# Patient Record
Sex: FEMALE | Race: WHITE | NOT HISPANIC OR LATINO | Employment: OTHER | URBAN - METROPOLITAN AREA
[De-identification: names, ages, dates, MRNs, and addresses within clinical notes are randomized per-mention and may not be internally consistent; named-entity substitution may affect disease eponyms.]

---

## 2018-07-25 ENCOUNTER — TRANSCRIBE ORDERS (OUTPATIENT)
Dept: ADMINISTRATIVE | Facility: HOSPITAL | Age: 83
End: 2018-07-25

## 2018-07-25 ENCOUNTER — HOSPITAL ENCOUNTER (OUTPATIENT)
Dept: GENERAL RADIOLOGY | Facility: HOSPITAL | Age: 83
Discharge: HOME | End: 2018-07-25
Admitting: PODIATRIST

## 2018-07-25 DIAGNOSIS — M25.571 JOINT PAIN OF ANKLE AND FOOT, RIGHT: Primary | ICD-10-CM

## 2018-07-25 DIAGNOSIS — M25.572 PAIN IN JOINT, ANKLE AND FOOT, LEFT: ICD-10-CM

## 2018-07-25 PROCEDURE — 73630 X-RAY EXAM OF FOOT: CPT

## 2020-05-18 PROCEDURE — U0003 INFECTIOUS AGENT DETECTION BY NUCLEIC ACID (DNA OR RNA); SEVERE ACUTE RESPIRATORY SYNDROME CORONAVIRUS 2 (SARS-COV-2) (CORONAVIRUS DISEASE [COVID-19]), AMPLIFIED PROBE TECHNIQUE, MAKING USE OF HIGH THROUGHPUT TECHNOLOGIES AS DESCRIBED BY CMS-2020-01-R: HCPCS | Performed by: NURSE PRACTITIONER

## 2020-05-20 ENCOUNTER — TELEPHONE (OUTPATIENT)
Dept: URGENT CARE | Facility: CLINIC | Age: 85
End: 2020-05-20

## 2020-05-21 ENCOUNTER — TELEPHONE (OUTPATIENT)
Dept: URGENT CARE | Facility: CLINIC | Age: 85
End: 2020-05-21

## 2020-08-27 PROCEDURE — U0003 INFECTIOUS AGENT DETECTION BY NUCLEIC ACID (DNA OR RNA); SEVERE ACUTE RESPIRATORY SYNDROME CORONAVIRUS 2 (SARS-COV-2) (CORONAVIRUS DISEASE [COVID-19]), AMPLIFIED PROBE TECHNIQUE, MAKING USE OF HIGH THROUGHPUT TECHNOLOGIES AS DESCRIBED BY CMS-2020-01-R: HCPCS | Performed by: NURSE PRACTITIONER

## 2020-08-31 ENCOUNTER — TELEPHONE (OUTPATIENT)
Dept: URGENT CARE | Facility: CLINIC | Age: 85
End: 2020-08-31

## 2021-01-16 PROCEDURE — U0004 COV-19 TEST NON-CDC HGH THRU: HCPCS | Performed by: FAMILY MEDICINE

## 2022-02-05 ENCOUNTER — HOSPITAL ENCOUNTER (OUTPATIENT)
Facility: HOSPITAL | Age: 87
Setting detail: OBSERVATION
Discharge: HOME HEALTH | End: 2022-02-07
Attending: EMERGENCY MEDICINE | Admitting: INTERNAL MEDICINE

## 2022-02-05 ENCOUNTER — APPOINTMENT (OUTPATIENT)
Dept: GENERAL RADIOLOGY | Facility: HOSPITAL | Age: 87
End: 2022-02-05

## 2022-02-05 DIAGNOSIS — R53.1 WEAKNESS: ICD-10-CM

## 2022-02-05 DIAGNOSIS — N28.9 RENAL INSUFFICIENCY: ICD-10-CM

## 2022-02-05 DIAGNOSIS — N18.30 STAGE 3 CHRONIC KIDNEY DISEASE, UNSPECIFIED WHETHER STAGE 3A OR 3B CKD: ICD-10-CM

## 2022-02-05 DIAGNOSIS — E86.0 DEHYDRATION: ICD-10-CM

## 2022-02-05 DIAGNOSIS — R53.1 GENERALIZED WEAKNESS: ICD-10-CM

## 2022-02-05 DIAGNOSIS — N39.0 ACUTE UTI: Primary | ICD-10-CM

## 2022-02-05 PROBLEM — D72.829 LEUKOCYTOSIS: Status: ACTIVE | Noted: 2022-02-05

## 2022-02-05 PROBLEM — I10 HTN (HYPERTENSION): Status: ACTIVE | Noted: 2022-02-05

## 2022-02-05 PROBLEM — E03.9 HYPOTHYROIDISM (ACQUIRED): Status: ACTIVE | Noted: 2022-02-05

## 2022-02-05 PROBLEM — I42.8 NICM (NONISCHEMIC CARDIOMYOPATHY) (HCC): Status: ACTIVE | Noted: 2022-02-05

## 2022-02-05 LAB
ALBUMIN SERPL-MCNC: 4.4 G/DL (ref 3.5–5.2)
ALBUMIN/GLOB SERPL: 1.3 G/DL
ALP SERPL-CCNC: 65 U/L (ref 39–117)
ALT SERPL W P-5'-P-CCNC: 14 U/L (ref 1–33)
ANION GAP SERPL CALCULATED.3IONS-SCNC: 12 MMOL/L (ref 5–15)
AST SERPL-CCNC: 14 U/L (ref 1–32)
BACTERIA UR QL AUTO: ABNORMAL /HPF
BASOPHILS # BLD AUTO: 0.02 10*3/MM3 (ref 0–0.2)
BASOPHILS NFR BLD AUTO: 0.2 % (ref 0–1.5)
BILIRUB SERPL-MCNC: 0.6 MG/DL (ref 0–1.2)
BILIRUB UR QL STRIP: NEGATIVE
BUN SERPL-MCNC: 45 MG/DL (ref 8–23)
BUN/CREAT SERPL: 31.3 (ref 7–25)
CALCIUM SPEC-SCNC: 9.6 MG/DL (ref 8.6–10.5)
CHLORIDE SERPL-SCNC: 104 MMOL/L (ref 98–107)
CK SERPL-CCNC: 60 U/L (ref 20–180)
CLARITY UR: CLEAR
CO2 SERPL-SCNC: 22 MMOL/L (ref 22–29)
COLOR UR: YELLOW
CREAT SERPL-MCNC: 1.44 MG/DL (ref 0.57–1)
D-LACTATE SERPL-SCNC: 1 MMOL/L (ref 0.5–2)
DEPRECATED RDW RBC AUTO: 43.8 FL (ref 37–54)
EOSINOPHIL # BLD AUTO: 0.04 10*3/MM3 (ref 0–0.4)
EOSINOPHIL NFR BLD AUTO: 0.3 % (ref 0.3–6.2)
ERYTHROCYTE [DISTWIDTH] IN BLOOD BY AUTOMATED COUNT: 15.3 % (ref 12.3–15.4)
FLUAV RNA RESP QL NAA+PROBE: NOT DETECTED
FLUBV RNA RESP QL NAA+PROBE: NOT DETECTED
GFR SERPL CREATININE-BSD FRML MDRD: 35 ML/MIN/1.73
GLOBULIN UR ELPH-MCNC: 3.5 GM/DL
GLUCOSE SERPL-MCNC: 108 MG/DL (ref 65–99)
GLUCOSE UR STRIP-MCNC: NEGATIVE MG/DL
HCT VFR BLD AUTO: 45.6 % (ref 34–46.6)
HGB BLD-MCNC: 15.2 G/DL (ref 12–15.9)
HGB UR QL STRIP.AUTO: ABNORMAL
HYALINE CASTS UR QL AUTO: ABNORMAL /LPF
IMM GRANULOCYTES # BLD AUTO: 0.04 10*3/MM3 (ref 0–0.05)
IMM GRANULOCYTES NFR BLD AUTO: 0.3 % (ref 0–0.5)
KETONES UR QL STRIP: NEGATIVE
LEUKOCYTE ESTERASE UR QL STRIP.AUTO: ABNORMAL
LYMPHOCYTES # BLD AUTO: 3.15 10*3/MM3 (ref 0.7–3.1)
LYMPHOCYTES NFR BLD AUTO: 24.9 % (ref 19.6–45.3)
MAGNESIUM SERPL-MCNC: 2.1 MG/DL (ref 1.6–2.4)
MCH RBC QN AUTO: 26.6 PG (ref 26.6–33)
MCHC RBC AUTO-ENTMCNC: 33.3 G/DL (ref 31.5–35.7)
MCV RBC AUTO: 79.7 FL (ref 79–97)
MONOCYTES # BLD AUTO: 0.82 10*3/MM3 (ref 0.1–0.9)
MONOCYTES NFR BLD AUTO: 6.5 % (ref 5–12)
NEUTROPHILS NFR BLD AUTO: 67.8 % (ref 42.7–76)
NEUTROPHILS NFR BLD AUTO: 8.59 10*3/MM3 (ref 1.7–7)
NITRITE UR QL STRIP: POSITIVE
NRBC BLD AUTO-RTO: 0 /100 WBC (ref 0–0.2)
PH UR STRIP.AUTO: 5.5 [PH] (ref 5–8)
PLATELET # BLD AUTO: 293 10*3/MM3 (ref 140–450)
PMV BLD AUTO: 9.9 FL (ref 6–12)
POTASSIUM SERPL-SCNC: 4.2 MMOL/L (ref 3.5–5.2)
PROT SERPL-MCNC: 7.9 G/DL (ref 6–8.5)
PROT UR QL STRIP: ABNORMAL
QT INTERVAL: 438 MS
QTC INTERVAL: 511 MS
RBC # BLD AUTO: 5.72 10*6/MM3 (ref 3.77–5.28)
RBC # UR STRIP: ABNORMAL /HPF
REF LAB TEST METHOD: ABNORMAL
SARS-COV-2 RNA RESP QL NAA+PROBE: NOT DETECTED
SODIUM SERPL-SCNC: 138 MMOL/L (ref 136–145)
SP GR UR STRIP: 1.01 (ref 1–1.03)
SQUAMOUS #/AREA URNS HPF: ABNORMAL /HPF
TSH SERPL DL<=0.05 MIU/L-ACNC: 3.58 UIU/ML (ref 0.27–4.2)
UROBILINOGEN UR QL STRIP: ABNORMAL
WBC # UR STRIP: ABNORMAL /HPF
WBC NRBC COR # BLD: 12.66 10*3/MM3 (ref 3.4–10.8)

## 2022-02-05 PROCEDURE — 81001 URINALYSIS AUTO W/SCOPE: CPT | Performed by: EMERGENCY MEDICINE

## 2022-02-05 PROCEDURE — 87636 SARSCOV2 & INF A&B AMP PRB: CPT | Performed by: EMERGENCY MEDICINE

## 2022-02-05 PROCEDURE — 36415 COLL VENOUS BLD VENIPUNCTURE: CPT

## 2022-02-05 PROCEDURE — 84443 ASSAY THYROID STIM HORMONE: CPT | Performed by: EMERGENCY MEDICINE

## 2022-02-05 PROCEDURE — 96365 THER/PROPH/DIAG IV INF INIT: CPT

## 2022-02-05 PROCEDURE — 87040 BLOOD CULTURE FOR BACTERIA: CPT | Performed by: EMERGENCY MEDICINE

## 2022-02-05 PROCEDURE — G0378 HOSPITAL OBSERVATION PER HR: HCPCS

## 2022-02-05 PROCEDURE — 80053 COMPREHEN METABOLIC PANEL: CPT | Performed by: EMERGENCY MEDICINE

## 2022-02-05 PROCEDURE — 83735 ASSAY OF MAGNESIUM: CPT | Performed by: EMERGENCY MEDICINE

## 2022-02-05 PROCEDURE — 87186 SC STD MICRODIL/AGAR DIL: CPT | Performed by: EMERGENCY MEDICINE

## 2022-02-05 PROCEDURE — P9612 CATHETERIZE FOR URINE SPEC: HCPCS

## 2022-02-05 PROCEDURE — 25010000002 HEPARIN (PORCINE) PER 1000 UNITS: Performed by: NURSE PRACTITIONER

## 2022-02-05 PROCEDURE — 83605 ASSAY OF LACTIC ACID: CPT | Performed by: EMERGENCY MEDICINE

## 2022-02-05 PROCEDURE — 25010000002 CEFTRIAXONE PER 250 MG: Performed by: EMERGENCY MEDICINE

## 2022-02-05 PROCEDURE — C9803 HOPD COVID-19 SPEC COLLECT: HCPCS

## 2022-02-05 PROCEDURE — 82550 ASSAY OF CK (CPK): CPT | Performed by: EMERGENCY MEDICINE

## 2022-02-05 PROCEDURE — 99220 PR INITIAL OBSERVATION CARE/DAY 70 MINUTES: CPT | Performed by: INTERNAL MEDICINE

## 2022-02-05 PROCEDURE — 96372 THER/PROPH/DIAG INJ SC/IM: CPT

## 2022-02-05 PROCEDURE — 71045 X-RAY EXAM CHEST 1 VIEW: CPT

## 2022-02-05 PROCEDURE — 99284 EMERGENCY DEPT VISIT MOD MDM: CPT

## 2022-02-05 PROCEDURE — 85025 COMPLETE CBC W/AUTO DIFF WBC: CPT | Performed by: EMERGENCY MEDICINE

## 2022-02-05 PROCEDURE — 87086 URINE CULTURE/COLONY COUNT: CPT | Performed by: EMERGENCY MEDICINE

## 2022-02-05 PROCEDURE — 87077 CULTURE AEROBIC IDENTIFY: CPT | Performed by: EMERGENCY MEDICINE

## 2022-02-05 PROCEDURE — 93005 ELECTROCARDIOGRAM TRACING: CPT | Performed by: EMERGENCY MEDICINE

## 2022-02-05 RX ORDER — POLYETHYLENE GLYCOL 3350 17 G/17G
17 POWDER, FOR SOLUTION ORAL DAILY PRN
Status: DISCONTINUED | OUTPATIENT
Start: 2022-02-05 | End: 2022-02-07 | Stop reason: HOSPADM

## 2022-02-05 RX ORDER — BISACODYL 10 MG
10 SUPPOSITORY, RECTAL RECTAL DAILY PRN
Status: DISCONTINUED | OUTPATIENT
Start: 2022-02-05 | End: 2022-02-07 | Stop reason: HOSPADM

## 2022-02-05 RX ORDER — ROPINIROLE 0.5 MG/1
1 TABLET, FILM COATED ORAL 2 TIMES DAILY
Status: DISCONTINUED | OUTPATIENT
Start: 2022-02-05 | End: 2022-02-07 | Stop reason: HOSPADM

## 2022-02-05 RX ORDER — MONTELUKAST SODIUM 10 MG/1
10 TABLET ORAL NIGHTLY
Status: DISCONTINUED | OUTPATIENT
Start: 2022-02-05 | End: 2022-02-07 | Stop reason: HOSPADM

## 2022-02-05 RX ORDER — ONDANSETRON 2 MG/ML
4 INJECTION INTRAMUSCULAR; INTRAVENOUS EVERY 6 HOURS PRN
Status: DISCONTINUED | OUTPATIENT
Start: 2022-02-05 | End: 2022-02-07 | Stop reason: HOSPADM

## 2022-02-05 RX ORDER — ROPINIROLE 1 MG/1
1 TABLET, FILM COATED ORAL 2 TIMES DAILY
Status: ON HOLD | COMMUNITY
End: 2022-10-03 | Stop reason: SDUPTHER

## 2022-02-05 RX ORDER — TRAZODONE HYDROCHLORIDE 100 MG/1
100 TABLET ORAL NIGHTLY
Status: DISCONTINUED | OUTPATIENT
Start: 2022-02-05 | End: 2022-02-07 | Stop reason: HOSPADM

## 2022-02-05 RX ORDER — ACETAMINOPHEN 650 MG/1
650 SUPPOSITORY RECTAL EVERY 4 HOURS PRN
Status: DISCONTINUED | OUTPATIENT
Start: 2022-02-05 | End: 2022-02-07

## 2022-02-05 RX ORDER — POTASSIUM CHLORIDE 1.5 G/1.77G
40 POWDER, FOR SOLUTION ORAL AS NEEDED
Status: DISCONTINUED | OUTPATIENT
Start: 2022-02-05 | End: 2022-02-06

## 2022-02-05 RX ORDER — MAGNESIUM SULFATE HEPTAHYDRATE 40 MG/ML
2 INJECTION, SOLUTION INTRAVENOUS AS NEEDED
Status: DISCONTINUED | OUTPATIENT
Start: 2022-02-05 | End: 2022-02-06

## 2022-02-05 RX ORDER — ACETAMINOPHEN 160 MG/5ML
650 SOLUTION ORAL EVERY 4 HOURS PRN
Status: DISCONTINUED | OUTPATIENT
Start: 2022-02-05 | End: 2022-02-07

## 2022-02-05 RX ORDER — BISACODYL 5 MG/1
5 TABLET, DELAYED RELEASE ORAL DAILY PRN
Status: DISCONTINUED | OUTPATIENT
Start: 2022-02-05 | End: 2022-02-07 | Stop reason: HOSPADM

## 2022-02-05 RX ORDER — POTASSIUM CHLORIDE 7.45 MG/ML
10 INJECTION INTRAVENOUS
Status: DISCONTINUED | OUTPATIENT
Start: 2022-02-05 | End: 2022-02-06

## 2022-02-05 RX ORDER — ACETAMINOPHEN 325 MG/1
650 TABLET ORAL EVERY 4 HOURS PRN
Status: DISCONTINUED | OUTPATIENT
Start: 2022-02-05 | End: 2022-02-07

## 2022-02-05 RX ORDER — SODIUM CHLORIDE 0.9 % (FLUSH) 0.9 %
10 SYRINGE (ML) INJECTION AS NEEDED
Status: DISCONTINUED | OUTPATIENT
Start: 2022-02-05 | End: 2022-02-07 | Stop reason: HOSPADM

## 2022-02-05 RX ORDER — HEPARIN SODIUM 5000 [USP'U]/ML
5000 INJECTION, SOLUTION INTRAVENOUS; SUBCUTANEOUS EVERY 12 HOURS SCHEDULED
Status: DISCONTINUED | OUTPATIENT
Start: 2022-02-05 | End: 2022-02-07 | Stop reason: HOSPADM

## 2022-02-05 RX ORDER — AMOXICILLIN 250 MG
2 CAPSULE ORAL 2 TIMES DAILY
Status: DISCONTINUED | OUTPATIENT
Start: 2022-02-05 | End: 2022-02-07 | Stop reason: HOSPADM

## 2022-02-05 RX ORDER — CARVEDILOL 6.25 MG/1
6.25 TABLET ORAL 2 TIMES DAILY WITH MEALS
Status: DISCONTINUED | OUTPATIENT
Start: 2022-02-05 | End: 2022-02-07 | Stop reason: HOSPADM

## 2022-02-05 RX ORDER — TRAZODONE HYDROCHLORIDE 50 MG/1
50 TABLET ORAL NIGHTLY
Status: DISCONTINUED | OUTPATIENT
Start: 2022-02-05 | End: 2022-02-05

## 2022-02-05 RX ORDER — LOSARTAN POTASSIUM 25 MG/1
25 TABLET ORAL
Status: DISCONTINUED | OUTPATIENT
Start: 2022-02-06 | End: 2022-02-07 | Stop reason: HOSPADM

## 2022-02-05 RX ORDER — SODIUM CHLORIDE 0.9 % (FLUSH) 0.9 %
10 SYRINGE (ML) INJECTION EVERY 12 HOURS SCHEDULED
Status: DISCONTINUED | OUTPATIENT
Start: 2022-02-05 | End: 2022-02-07 | Stop reason: HOSPADM

## 2022-02-05 RX ORDER — ONDANSETRON 4 MG/1
4 TABLET, FILM COATED ORAL EVERY 6 HOURS PRN
Status: DISCONTINUED | OUTPATIENT
Start: 2022-02-05 | End: 2022-02-07 | Stop reason: HOSPADM

## 2022-02-05 RX ORDER — MAGNESIUM SULFATE HEPTAHYDRATE 40 MG/ML
4 INJECTION, SOLUTION INTRAVENOUS AS NEEDED
Status: DISCONTINUED | OUTPATIENT
Start: 2022-02-05 | End: 2022-02-06

## 2022-02-05 RX ORDER — POTASSIUM CHLORIDE 750 MG/1
40 CAPSULE, EXTENDED RELEASE ORAL AS NEEDED
Status: DISCONTINUED | OUTPATIENT
Start: 2022-02-05 | End: 2022-02-06

## 2022-02-05 RX ORDER — LEVOTHYROXINE SODIUM 112 UG/1
112 TABLET ORAL DAILY
Status: DISCONTINUED | OUTPATIENT
Start: 2022-02-06 | End: 2022-02-07 | Stop reason: HOSPADM

## 2022-02-05 RX ADMIN — TRAZODONE HYDROCHLORIDE 100 MG: 100 TABLET ORAL at 21:28

## 2022-02-05 RX ADMIN — ACETAMINOPHEN 650 MG: 325 TABLET, FILM COATED ORAL at 21:27

## 2022-02-05 RX ADMIN — CARVEDILOL 6.25 MG: 6.25 TABLET, FILM COATED ORAL at 21:28

## 2022-02-05 RX ADMIN — SODIUM CHLORIDE 1000 ML: 9 INJECTION, SOLUTION INTRAVENOUS at 17:16

## 2022-02-05 RX ADMIN — SENNOSIDES AND DOCUSATE SODIUM 2 TABLET: 50; 8.6 TABLET ORAL at 21:28

## 2022-02-05 RX ADMIN — MONTELUKAST SODIUM 10 MG: 10 TABLET, COATED ORAL at 21:29

## 2022-02-05 RX ADMIN — HEPARIN SODIUM 5000 UNITS: 5000 INJECTION, SOLUTION INTRAVENOUS; SUBCUTANEOUS at 21:00

## 2022-02-05 RX ADMIN — ROPINIROLE HYDROCHLORIDE 1 MG: 0.5 TABLET, FILM COATED ORAL at 21:36

## 2022-02-05 RX ADMIN — Medication 10 ML: at 21:36

## 2022-02-05 RX ADMIN — SODIUM CHLORIDE 1 G: 900 INJECTION INTRAVENOUS at 17:16

## 2022-02-05 NOTE — ED PROVIDER NOTES
EMERGENCY DEPARTMENT ENCOUNTER    Pt Name: Ila Galaviz  MRN: [id number]  Pt :   1935  Room Number:    Date of encounter:  2022  PCP: Erin Rubio APRN  ED Provider: Sanjay Roberts MD    Historian: Patient      HPI:  Chief Complaint: Severe generalized weakness        Context: Ila Galaviz is a 86 y.o. female who presents to the ED c/o generalized weakness which started roughly 1 week ago.  The patient notes that she was taken off of her pregabalin 8 days ago.  Her symptoms started soon thereafter.  She presented to the [facility] emergency department 3 or 4 days ago and tells me that nothing was found and she was discharged home.  Her generalized weakness is increasing and at this point she is unable to walk.  She sat on a rolling walker today and had her  pushed her from room to room so she could use the restroom.  She denies fevers and chills.  She denies nausea vomiting and diarrhea.  She rates her symptoms severe but notes no dysuria/hematuria.      PAST MEDICAL HISTORY  Past Medical History:   Diagnosis Date   • CHF (congestive heart failure) (HCC)    • Disease of thyroid gland    • Hypertension    • Kidney disease          PAST SURGICAL HISTORY  Past Surgical History:   Procedure Laterality Date   • THYROID SURGERY           FAMILY HISTORY  History reviewed. No pertinent family history.      SOCIAL HISTORY  Social History     Socioeconomic History   • Marital status:    Tobacco Use   • Smoking status: Never Smoker   • Smokeless tobacco: Never Used   Substance and Sexual Activity   • Alcohol use: Never   • Drug use: Never   • Sexual activity: Defer         ALLERGIES  Sulfa antibiotics, Erythromycin base, Penicillins, and Tetracycline hcl        REVIEW OF SYSTEMS  Review of Systems       All systems reviewed and negative except for those discussed in HPI.       PHYSICAL EXAM    I have reviewed the triage vital signs and nursing notes.    ED Triage Vitals [22  1425]   Temp Heart Rate Resp BP SpO2   97.7 °F (36.5 °C) 81 16 (!) 179/109 96 %      Temp src Heart Rate Source Patient Position BP Location FiO2 (%)   Oral Monitor Lying Left arm --       Physical Exam  GENERAL:   Appears quite weak but nontoxic.  HENT: Nares patent.  Somewhat dry mucous membranes.  EYES: No scleral icterus.  CV: Regular rhythm, regular rate.  No murmurs gallops rubs.  Clear to auscultation.  RESPIRATORY: Normal effort.  No audible wheezes, rales or rhonchi.  ABDOMEN: Soft, nontender to deep palpation.  MUSCULOSKELETAL: No deformities.   NEURO: Alert, moves all extremities, follows commands.  SKIN: Warm, dry, no rash visualized.        LAB RESULTS  Recent Results (from the past 24 hour(s))   Comprehensive Metabolic Panel    Collection Time: 02/05/22  3:00 PM    Specimen: Blood   Result Value Ref Range    Glucose 108 (H) 65 - 99 mg/dL    BUN 45 (H) 8 - 23 mg/dL    Creatinine 1.44 (H) 0.57 - 1.00 mg/dL    Sodium 138 136 - 145 mmol/L    Potassium 4.2 3.5 - 5.2 mmol/L    Chloride 104 98 - 107 mmol/L    CO2 22.0 22.0 - 29.0 mmol/L    Calcium 9.6 8.6 - 10.5 mg/dL    Total Protein 7.9 6.0 - 8.5 g/dL    Albumin 4.40 3.50 - 5.20 g/dL    ALT (SGPT) 14 1 - 33 U/L    AST (SGOT) 14 1 - 32 U/L    Alkaline Phosphatase 65 39 - 117 U/L    Total Bilirubin 0.6 0.0 - 1.2 mg/dL    eGFR Non African Amer 35 (L) >60 mL/min/1.73    Globulin 3.5 gm/dL    A/G Ratio 1.3 g/dL    BUN/Creatinine Ratio 31.3 (H) 7.0 - 25.0    Anion Gap 12.0 5.0 - 15.0 mmol/L   TSH    Collection Time: 02/05/22  3:00 PM    Specimen: Blood   Result Value Ref Range    TSH 3.580 0.270 - 4.200 uIU/mL   Magnesium    Collection Time: 02/05/22  3:00 PM    Specimen: Blood   Result Value Ref Range    Magnesium 2.1 1.6 - 2.4 mg/dL   CK    Collection Time: 02/05/22  3:00 PM    Specimen: Blood   Result Value Ref Range    Creatine Kinase 60 20 - 180 U/L   CBC Auto Differential    Collection Time: 02/05/22  3:00 PM    Specimen: Blood   Result Value Ref Range     WBC 12.66 (H) 3.40 - 10.80 10*3/mm3    RBC 5.72 (H) 3.77 - 5.28 10*6/mm3    Hemoglobin 15.2 12.0 - 15.9 g/dL    Hematocrit 45.6 34.0 - 46.6 %    MCV 79.7 79.0 - 97.0 fL    MCH 26.6 26.6 - 33.0 pg    MCHC 33.3 31.5 - 35.7 g/dL    RDW 15.3 12.3 - 15.4 %    RDW-SD 43.8 37.0 - 54.0 fl    MPV 9.9 6.0 - 12.0 fL    Platelets 293 140 - 450 10*3/mm3    Neutrophil % 67.8 42.7 - 76.0 %    Lymphocyte % 24.9 19.6 - 45.3 %    Monocyte % 6.5 5.0 - 12.0 %    Eosinophil % 0.3 0.3 - 6.2 %    Basophil % 0.2 0.0 - 1.5 %    Immature Grans % 0.3 0.0 - 0.5 %    Neutrophils, Absolute 8.59 (H) 1.70 - 7.00 10*3/mm3    Lymphocytes, Absolute 3.15 (H) 0.70 - 3.10 10*3/mm3    Monocytes, Absolute 0.82 0.10 - 0.90 10*3/mm3    Eosinophils, Absolute 0.04 0.00 - 0.40 10*3/mm3    Basophils, Absolute 0.02 0.00 - 0.20 10*3/mm3    Immature Grans, Absolute 0.04 0.00 - 0.05 10*3/mm3    nRBC 0.0 0.0 - 0.2 /100 WBC   COVID-19 and FLU A/B PCR - Swab, Nasopharynx    Collection Time: 02/05/22  3:00 PM    Specimen: Nasopharynx; Swab   Result Value Ref Range    COVID19 Not Detected Not Detected - Ref. Range    Influenza A PCR Not Detected Not Detected    Influenza B PCR Not Detected Not Detected   ECG 12 Lead    Collection Time: 02/05/22  3:31 PM   Result Value Ref Range    QT Interval 438 ms    QTC Interval 511 ms   Urinalysis With Culture If Indicated - Urine, Catheter In/Out    Collection Time: 02/05/22  3:37 PM    Specimen: Urine, Catheter In/Out   Result Value Ref Range    Color, UA Yellow Yellow, Straw    Appearance, UA Clear Clear    pH, UA 5.5 5.0 - 8.0    Specific Gravity, UA 1.015 1.001 - 1.030    Glucose, UA Negative Negative    Ketones, UA Negative Negative    Bilirubin, UA Negative Negative    Blood, UA Trace (A) Negative    Protein,  mg/dL (2+) (A) Negative    Leuk Esterase, UA Small (1+) (A) Negative    Nitrite, UA Positive (A) Negative    Urobilinogen, UA 0.2 E.U./dL 0.2 - 1.0 E.U./dL   Urinalysis, Microscopic Only - Urine, Catheter In/Out     Collection Time: 02/05/22  3:37 PM    Specimen: Urine, Catheter In/Out   Result Value Ref Range    RBC, UA 31-50 (A) None Seen, 0-2 /HPF    WBC, UA 6-12 (A) None Seen, 0-2 /HPF    Bacteria, UA 4+ (A) None Seen, Trace /HPF    Squamous Epithelial Cells, UA 0-2 None Seen, 0-2 /HPF    Hyaline Casts, UA 0-6 0 - 6 /LPF    Methodology Automated Microscopy    Lactic Acid, Plasma    Collection Time: 02/05/22  5:13 PM    Specimen: Blood   Result Value Ref Range    Lactate 1.0 0.5 - 2.0 mmol/L       If labs were ordered, I independently reviewed the results.        RADIOLOGY  XR Chest 1 View    Result Date: 2/5/2022  EXAMINATION: XR CHEST 1 VW-  INDICATION: weak  COMPARISON: NONE  FINDINGS: Left chest wall ICD noted in place. Mild chronic changes of the lung fields are present without focal airspace opacity. There is no significant pleural effusion or distinct pneumothorax. Normal heart and mediastinal contours.      Mild chronic changes of the lung fields without evidence of acute cardiopulmonary abnormality.  This report was finalized on 2/5/2022 4:15 PM by Wild Rea.        I ordered and reviewed the above noted radiographic studies.      I viewed images of chest x-ray which showed no active disease per my independent interpretation.    See radiologist's dictation for official interpretation.        PROCEDURES    Procedures    ECG 12 Lead   Final Result   Test Reason : weakness   Blood Pressure :   */*   mmHG   Vent. Rate :  82 BPM     Atrial Rate :  82 BPM      P-R Int : 144 ms          QRS Dur : 158 ms       QT Int : 438 ms       P-R-T Axes :  65 -89  83 degrees      QTc Int : 511 ms      Atrial-sensed ventricular-paced rhythm   Biventricular pacemaker detected   Abnormal ECG   No previous ECGs available   Confirmed by LEANNA ESPINOZA MD ([id number]) on 2/5/2022 4:18:09 PM      Referred By: EDMD           Confirmed By: LEANNA ESPINOZA MD          MEDICATIONS GIVEN IN ER    Medications   cefTRIAXone (ROCEPHIN) 1 g/100 mL  0.9% NS (MBP) (1 g Intravenous New Bag 2/5/22 1716)   sodium chloride 0.9 % bolus 1,000 mL (1,000 mL Intravenous New Bag 2/5/22 1716)         PROGRESS, DATA ANALYSIS, CONSULTS, AND MEDICAL DECISION MAKING    All labs have been independently reviewed by me.  All radiology studies have been reviewed by me and the radiologist dictating the report.   EKG's have been independently viewed and interpreted by me.      Differential diagnoses: Occult infection such as UTI versus CVA, etc.      ED Course as of 02/05/22 1806   Sat Feb 05, 2022   1612 Repeat blood pressure at this time is 177/101.  I have ordered urine culture and IV Rocephin.  We are bolusing with normal saline.  Patient feels unable to ambulate at home and I will have her admitted. [MS]   1614 I have paged the hospitalist for admission. [MS]   1641 I spoke with Dr. Benavides who will admit. [MS]      ED Course User Index  [MS] Sanjay Roberts MD             AS OF 18:06 EST VITALS:    BP - (!) 163/101  HR - 78  TEMP - 97.7 °F (36.5 °C) (Oral)  O2 SATS - 95%                  DIAGNOSIS  Final diagnoses:   Acute UTI   Dehydration   Renal insufficiency   Generalized weakness         DISPOSITION  Admission           Sanjay Roberts MD  02/06/22 2113

## 2022-02-05 NOTE — H&P
[facility] Medicine Services  HISTORY AND PHYSICAL    Patient Name: Ila Galaviz  : 1935  MRN: [id number]  Primary Care Physician: Erin Rubio APRN  Date of admission: 2022    Subjective   Subjective     Chief Complaint:  Weakness    HPI:  Ila Galaviz is a 86 y.o. female with a past medical history significant for NICM, SSS s/p PPM, HTN, CKD, hypothyroidism, and peripheral neuropathy who presents to the ED due to increased weakness over the past week. She quit taking her pregabalin approximately 8 days ago due to feeling like she did not need it anymore and has had progressive weakness since that time. She presented to the ED at [facility] 3 days ago due to weakness and back pain. She was discharged home with steroid dose pack, baclofen, and gabapentin. She has not started taking any of these of medications. She has continued to feel more weak and today was unable to walk without assistance which prompted her to come to the ED for evaluation. She denies any recent falls or head injury. She has a history of chronic back pain and has previously been told she has degenerative disc disease. She denies any recent shortness of breath, fever, cough, chills, or loss of taste/smell. She has been previously vaccinated for COVID-19.     In the ED, x-ray revealed mild chronic changes of the lung fields without evidence of acute cardiopulmonary abnormality.  Labs reviewed and significant for creatinine 1.44, EGFR 35, and WBC 12.66.  UA concerning for acute UTI with positive nitrites, 1+ leukocytes, and 4+ bacteria.  Vital signs reviewed and significant for blood pressure of 179/109.  The patient was admitted by hospital medicine for further evaluation and treatment.    COVID Details:        Symptoms: [x] NONE [] Fever []  Cough [] Shortness of breath [] Change in taste or smell  The patient has a COVID HM Topic on their chart, and they are fully vaccinated.    Review of Systems    Constitutional: Positive for activity change and fatigue. Negative for appetite change, chills and fever.   HENT: Negative for congestion, trouble swallowing and voice change.    Eyes: Negative for photophobia, discharge and visual disturbance.   Respiratory: Negative for cough, shortness of breath and wheezing.    Cardiovascular: Negative for chest pain, palpitations and leg swelling.   Gastrointestinal: Positive for constipation. Negative for abdominal distention, diarrhea and vomiting.   Endocrine: Negative for polydipsia, polyphagia and polyuria.   Genitourinary: Positive for frequency and urgency. Negative for difficulty urinating.   Musculoskeletal: Positive for back pain. Negative for myalgias, neck pain and neck stiffness.   Skin: Negative for color change, pallor and rash.   Allergic/Immunologic: Negative.    Neurological: Positive for weakness. Negative for dizziness, seizures and headaches.   Hematological: Negative.    Psychiatric/Behavioral: Negative for agitation, confusion and suicidal ideas. The patient is not nervous/anxious.         All other systems reviewed and are negative.     Personal History     Past Medical History:   Diagnosis Date   • CHF (congestive heart failure) (HCC)    • Disease of thyroid gland    • Hypertension    • Kidney disease        Past Surgical History:   Procedure Laterality Date   • THYROID SURGERY         Family History: Family history reviewed with the patient and felt to be non-contributory to the current presentation.    Social History:  reports that she has never smoked. She has never used smokeless tobacco. She reports that she does not drink alcohol and does not use drugs.  Social History     Social History Narrative   • Not on file       Medications:  Irbesartan, Pregabalin, acetaminophen, carvedilol, levothyroxine, methylPREDNISolone, montelukast, multivitamin with minerals, saccharomyces boulardii, and traZODone    Allergies   Allergen Reactions   • Sulfa  Antibiotics Hives   • Erythromycin Base Rash   • Penicillins Rash   • Tetracycline Hcl Rash       Objective   Objective     Vital Signs:   Temp:  [97.7 °F (36.5 °C)] 97.7 °F (36.5 °C)  Heart Rate:  [78-81] 78  Resp:  [15-16] 15  BP: (163-179)/(101-109) 163/101    Physical Exam   Constitutional: Awake, alert, elderly female, frail   Eyes: PERRLA, sclerae anicteric, no conjunctival injection  HENT: NCAT, mucous membranes moist  Neck: Supple, no thyromegaly, no lymphadenopathy, trachea midline  Respiratory: Clear to auscultation bilaterally, nonlabored respirations on RA  Cardiovascular: RRR, no murmurs, rubs, or gallops  Gastrointestinal: Positive bowel sounds, soft, nontender, nondistended  Musculoskeletal: No bilateral ankle edema, no clubbing or cyanosis to extremities  Psychiatric: Appropriate affect, cooperative  Neurologic: Oriented x 3, strength symmetric in all extremities, speech clear  Skin: warm, dry, no visible rash       Result Review:  I have personally reviewed the results from the time of this admission to 02/05/22 5:08 PM EST and agree with these findings:  [x]  Laboratory  [x]  Microbiology  [x]  Radiology  [x]  EKG/Telemetry   []  Cardiology/Vascular   []  Pathology  [x]  Old records  []  Other:  Most notable findings include:       LAB RESULTS:      Lab 02/05/22  1500   WBC 12.66*   HEMOGLOBIN 15.2   HEMATOCRIT 45.6   PLATELETS 293   NEUTROS ABS 8.59*   IMMATURE GRANS (ABS) 0.04   LYMPHS ABS 3.15*   MONOS ABS 0.82   EOS ABS 0.04   MCV 79.7         Lab 02/05/22  1500   SODIUM 138   POTASSIUM 4.2   CHLORIDE 104   CO2 22.0   ANION GAP 12.0   BUN 45*   CREATININE 1.44*   GLUCOSE 108*   CALCIUM 9.6   MAGNESIUM 2.1   TSH 3.580         Lab 02/05/22  1500   TOTAL PROTEIN 7.9   ALBUMIN 4.40   GLOBULIN 3.5   ALT (SGPT) 14   AST (SGOT) 14   BILIRUBIN 0.6   ALK PHOS 65                     UA    Urinalysis 2/5/22 2/5/22    1537 1537   Squamous Epithelial Cells, UA  0-2   Specific [place], UA 1.015    Ketones,  UA Negative    Blood, UA Trace (A)    Leukocytes, UA Small (1+) (A)    Nitrite, UA Positive (A)    RBC, UA  31-50 (A)   WBC, UA  6-12 (A)   Bacteria, UA  4+ (A)   (A) Abnormal value            Microbiology Results (last 10 days)     Procedure Component Value - Date/Time    COVID PRE-OP / PRE-PROCEDURE SCREENING ORDER (NO ISOLATION) - Swab, Nasopharynx [[id number]]  (Normal) Collected: 02/05/22 1500    Lab Status: Final result Specimen: Swab from Nasopharynx Updated: 02/05/22 1554    Narrative:      The following orders were created for panel order COVID PRE-OP / PRE-PROCEDURE SCREENING ORDER (NO ISOLATION) - Swab, Nasopharynx.  Procedure                               Abnormality         Status                     ---------                               -----------         ------                     COVID-19 and FLU A/B PCR...[[id number]]  Normal              Final result                 Please view results for these tests on the individual orders.    COVID-19 and FLU A/B PCR - Swab, Nasopharynx [[id number]]  (Normal) Collected: 02/05/22 1500    Lab Status: Final result Specimen: Swab from Nasopharynx Updated: 02/05/22 1554     COVID19 Not Detected     Influenza A PCR Not Detected     Influenza B PCR Not Detected    Narrative:      Fact sheet for providers: https://www.fda.gov/media/956674/download    Fact sheet for patients: https://www.fda.gov/media/681675/download    Test performed by PCR.          XR Chest 1 View    Result Date: 2/5/2022  EXAMINATION: XR CHEST 1 VW-  INDICATION: weak  COMPARISON: NONE  FINDINGS: Left chest wall ICD noted in place. Mild chronic changes of the lung fields are present without focal airspace opacity. There is no significant pleural effusion or distinct pneumothorax. Normal heart and mediastinal contours.      Impression: Mild chronic changes of the lung fields without evidence of acute cardiopulmonary abnormality.  This report was finalized on 2/5/2022 4:15 PM by Wild Rea.             Assessment/Plan   Assessment & Plan       Acute UTI    Leukocytosis    Weakness    CKD (chronic kidney disease) stage 3, GFR 30-59 ml/min (HCC)    NICM (nonischemic cardiomyopathy) (HCC)    HTN (hypertension)    Hypothyroidism (acquired)    Ila Galaviz is a 86 y.o. female with a past medical history significant for NICM, SSS s/p PPM, HTN, CKD, hypothyroidism, and peripheral neuropathy who presents to the ED due to increased weakness over the past week.     Acute UTI  Leukocytosis  -Urine and blood cultures pending  -Started on Rocephin      Weakness  -suspect due to acute UTI  -PT/OT consults  -Fall precautions    CKD   -Unclear baseline, data deficit  -Pt endorses hx of CKD  -Creatinine 1.44, eFGR 35 on presentation  -Avoid nephrotoxins  -s/p 1L NS bolus in ED    NICM   SSS s/p PPM  -Follows with Kesha LOR[facility]O at [facility]  -EF 25-30% per [facility] in 2018 per chart review  -Echo ordered    HTN  -Continue home Coreg, Losartan (sub for Irbesartan)    Hypothyroidism  -TSH 3.580  -Continue Synthroid    DVT prophylaxis:  Heparin Sq    CODE STATUS:  Level Of Support Discussed With: Patient  Code Status (Patient has no pulse and is not breathing): CPR (Attempt to Resuscitate)  Medical Interventions (Patient has pulse or is breathing): Full Support      This note has been completed as part of a split-shared workflow.     Electronically signed by FACUNDO Bates, 02/05/22, 5:08 PM EST.      Attending   Admission Attestation       I have seen and examined the patient, performing an independent face-to-face diagnostic evaluation with plan of care reviewed and developed with the advanced practice clinician (APC).      Brief Summary Statement:   Ila Galaviz is a 86 y.o. female to this facility with reported history of CKD unknown stage, hypothyroidism, CHF unknown systolic/diastolic was brought in for evaluation of increased generalized weakness.  Approximately 8 days ago she stopped taking her pregabalin  and has been feeling weak since then.  She was seen at [facility] 3 days ago with weakness and back pain, sent home on steroids, gabapentin, baclofen but has not taken any of them.  To me she reports increased urinary frequency without dysuria.    Remainder of detailed HPI is as noted by APC and has been reviewed and/or edited by me for completeness.    Attending Physical Exam:  Constitutional: Awake, alert, frail-appearing elderly female laying in bed in no acute distress  Eyes: PERRL, sclerae anicteric, no conjunctival injection  HENT: NCAT, mucous membranes moist  Neck: Supple, trachea midline  Respiratory: Clear to auscultation bilaterally, nonlabored respirations   Cardiovascular: RRR, no murmurs, rubs, or gallops, palpable radial pulses bilaterally  Gastrointestinal: Positive bowel sounds, soft, nontender, nondistended  Musculoskeletal: No bilateral ankle edema, no clubbing or cyanosis to extremities  Psychiatric: Appropriate affect, cooperative  Neurologic: Speech clear, moving all extremity spontaneously    Brief Assessment/Plan :  See detailed assessment and plan developed with APC which I have reviewed and/or edited for completeness.        Admission Status: I believe that this patient meets OBSERVATION status, however if further evaluation or treatment plans warrant, status may change.  Based upon current information, I predict patient's care encounter to be less than or equal to 2 midnights.        Jack Benavides, DO  02/05/22

## 2022-02-06 ENCOUNTER — APPOINTMENT (OUTPATIENT)
Dept: CARDIOLOGY | Facility: HOSPITAL | Age: 87
End: 2022-02-06

## 2022-02-06 LAB
ANION GAP SERPL CALCULATED.3IONS-SCNC: 12 MMOL/L (ref 5–15)
BASOPHILS # BLD AUTO: 0.03 10*3/MM3 (ref 0–0.2)
BASOPHILS NFR BLD AUTO: 0.3 % (ref 0–1.5)
BH CV ECHO MEAS - AI DEC SLOPE: 291.7 CM/SEC^2
BH CV ECHO MEAS - AI MAX PG: 81.4 MMHG
BH CV ECHO MEAS - AI MAX VEL: 450.6 CM/SEC
BH CV ECHO MEAS - AI P1/2T: 452.5 MSEC
BH CV ECHO MEAS - AO MAX PG (FULL): 9.9 MMHG
BH CV ECHO MEAS - AO MAX PG: 12 MMHG
BH CV ECHO MEAS - AO MEAN PG (FULL): 4.8 MMHG
BH CV ECHO MEAS - AO MEAN PG: 6 MMHG
BH CV ECHO MEAS - AO ROOT AREA (BSA CORRECTED): 1.6
BH CV ECHO MEAS - AO ROOT AREA: 5.3 CM^2
BH CV ECHO MEAS - AO ROOT DIAM: 2.6 CM
BH CV ECHO MEAS - AO V2 MAX: 172.4 CM/SEC
BH CV ECHO MEAS - AO V2 MEAN: 112.3 CM/SEC
BH CV ECHO MEAS - AO V2 VTI: 30.1 CM
BH CV ECHO MEAS - AVA(I,A): 1.4 CM^2
BH CV ECHO MEAS - AVA(I,D): 1.4 CM^2
BH CV ECHO MEAS - AVA(V,A): 1.3 CM^2
BH CV ECHO MEAS - AVA(V,D): 1.3 CM^2
BH CV ECHO MEAS - BSA(HAYCOCK): 1.7 M^2
BH CV ECHO MEAS - BSA: 1.7 M^2
BH CV ECHO MEAS - BZI_BMI: 23.2 KILOGRAMS/M^2
BH CV ECHO MEAS - BZI_METRIC_HEIGHT: 162.6 CM
BH CV ECHO MEAS - BZI_METRIC_WEIGHT: 61.2 KG
BH CV ECHO MEAS - EDV(CUBED): 60.2 ML
BH CV ECHO MEAS - EDV(MOD-SP2): 32.6 ML
BH CV ECHO MEAS - EDV(MOD-SP4): 49.3 ML
BH CV ECHO MEAS - EDV(TEICH): 66.7 ML
BH CV ECHO MEAS - EF(CUBED): 70.3 %
BH CV ECHO MEAS - EF(MOD-BP): 52 %
BH CV ECHO MEAS - EF(MOD-SP2): 51.5 %
BH CV ECHO MEAS - EF(MOD-SP4): 45.6 %
BH CV ECHO MEAS - EF(TEICH): 62.6 %
BH CV ECHO MEAS - ESV(CUBED): 17.9 ML
BH CV ECHO MEAS - ESV(MOD-SP2): 15.8 ML
BH CV ECHO MEAS - ESV(MOD-SP4): 26.8 ML
BH CV ECHO MEAS - ESV(TEICH): 25 ML
BH CV ECHO MEAS - FS: 33.3 %
BH CV ECHO MEAS - IVS/LVPW: 0.96
BH CV ECHO MEAS - IVSD: 1 CM
BH CV ECHO MEAS - LA DIMENSION: 3.9 CM
BH CV ECHO MEAS - LA/AO: 1.5
BH CV ECHO MEAS - LAD MAJOR: 5.2 CM
BH CV ECHO MEAS - LAT PEAK E' VEL: 7.4 CM/SEC
BH CV ECHO MEAS - LATERAL E/E' RATIO: 10.3
BH CV ECHO MEAS - LV DIASTOLIC VOL/BSA (35-75): 29.8 ML/M^2
BH CV ECHO MEAS - LV IVRT: 0.08 SEC
BH CV ECHO MEAS - LV MASS(C)D: 132 GRAMS
BH CV ECHO MEAS - LV MASS(C)DI: 79.7 GRAMS/M^2
BH CV ECHO MEAS - LV MAX PG: 2.1 MMHG
BH CV ECHO MEAS - LV MEAN PG: 1.3 MMHG
BH CV ECHO MEAS - LV SYSTOLIC VOL/BSA (12-30): 16.2 ML/M^2
BH CV ECHO MEAS - LV V1 MAX: 72.2 CM/SEC
BH CV ECHO MEAS - LV V1 MEAN: 52.1 CM/SEC
BH CV ECHO MEAS - LV V1 VTI: 13.8 CM
BH CV ECHO MEAS - LVIDD: 3.9 CM
BH CV ECHO MEAS - LVIDS: 2.6 CM
BH CV ECHO MEAS - LVLD AP2: 6.1 CM
BH CV ECHO MEAS - LVLD AP4: 7.7 CM
BH CV ECHO MEAS - LVLS AP2: 5.8 CM
BH CV ECHO MEAS - LVLS AP4: 6.3 CM
BH CV ECHO MEAS - LVOT AREA (M): 3.1 CM^2
BH CV ECHO MEAS - LVOT AREA: 3.1 CM^2
BH CV ECHO MEAS - LVOT DIAM: 2 CM
BH CV ECHO MEAS - LVPWD: 1.1 CM
BH CV ECHO MEAS - MED PEAK E' VEL: 3.6 CM/SEC
BH CV ECHO MEAS - MEDIAL E/E' RATIO: 21.3
BH CV ECHO MEAS - MV A MAX VEL: 164.1 CM/SEC
BH CV ECHO MEAS - MV DEC SLOPE: 436.6 CM/SEC^2
BH CV ECHO MEAS - MV DEC TIME: 0.16 SEC
BH CV ECHO MEAS - MV E MAX VEL: 76.4 CM/SEC
BH CV ECHO MEAS - MV E/A: 0.47
BH CV ECHO MEAS - MV MAX PG: 10.7 MMHG
BH CV ECHO MEAS - MV MEAN PG: 5.1 MMHG
BH CV ECHO MEAS - MV P1/2T MAX VEL: 129 CM/SEC
BH CV ECHO MEAS - MV P1/2T: 86.5 MSEC
BH CV ECHO MEAS - MV V2 MAX: 163.7 CM/SEC
BH CV ECHO MEAS - MV V2 MEAN: 105.9 CM/SEC
BH CV ECHO MEAS - MV V2 VTI: 38.5 CM
BH CV ECHO MEAS - MVA P1/2T LCG: 1.7 CM^2
BH CV ECHO MEAS - MVA(P1/2T): 2.5 CM^2
BH CV ECHO MEAS - MVA(VTI): 1.1 CM^2
BH CV ECHO MEAS - PA ACC TIME: 0.08 SEC
BH CV ECHO MEAS - PA PR(ACCEL): 44.1 MMHG
BH CV ECHO MEAS - PI END-D VEL: 67.9 CM/SEC
BH CV ECHO MEAS - RAP SYSTOLE: 3 MMHG
BH CV ECHO MEAS - RVSP: 18 MMHG
BH CV ECHO MEAS - SI(AO): 95.5 ML/M^2
BH CV ECHO MEAS - SI(CUBED): 25.6 ML/M^2
BH CV ECHO MEAS - SI(LVOT): 25.7 ML/M^2
BH CV ECHO MEAS - SI(MOD-SP2): 10.2 ML/M^2
BH CV ECHO MEAS - SI(MOD-SP4): 13.6 ML/M^2
BH CV ECHO MEAS - SI(TEICH): 25.2 ML/M^2
BH CV ECHO MEAS - SV(AO): 158.1 ML
BH CV ECHO MEAS - SV(CUBED): 42.4 ML
BH CV ECHO MEAS - SV(LVOT): 42.5 ML
BH CV ECHO MEAS - SV(MOD-SP2): 16.8 ML
BH CV ECHO MEAS - SV(MOD-SP4): 22.5 ML
BH CV ECHO MEAS - SV(TEICH): 41.8 ML
BH CV ECHO MEAS - TAPSE (>1.6): 1.6 CM
BH CV ECHO MEAS - TR MAX PG: 15 MMHG
BH CV ECHO MEAS - TR MAX VEL: 193.1 CM/SEC
BH CV ECHO MEASUREMENTS AVERAGE E/E' RATIO: 13.89
BH CV VAS BP RIGHT ARM: NORMAL MMHG
BH CV XLRA - RV BASE: 3.7 CM
BH CV XLRA - RV LENGTH: 5.7 CM
BH CV XLRA - RV MID: 2.2 CM
BH CV XLRA - TDI S': 7.6 CM/SEC
BUN SERPL-MCNC: 39 MG/DL (ref 8–23)
BUN/CREAT SERPL: 26.2 (ref 7–25)
CALCIUM SPEC-SCNC: 8.6 MG/DL (ref 8.6–10.5)
CHLORIDE SERPL-SCNC: 107 MMOL/L (ref 98–107)
CO2 SERPL-SCNC: 20 MMOL/L (ref 22–29)
CREAT SERPL-MCNC: 1.49 MG/DL (ref 0.57–1)
DEPRECATED RDW RBC AUTO: 46.2 FL (ref 37–54)
EOSINOPHIL # BLD AUTO: 0.09 10*3/MM3 (ref 0–0.4)
EOSINOPHIL NFR BLD AUTO: 1 % (ref 0.3–6.2)
ERYTHROCYTE [DISTWIDTH] IN BLOOD BY AUTOMATED COUNT: 15.4 % (ref 12.3–15.4)
GFR SERPL CREATININE-BSD FRML MDRD: 33 ML/MIN/1.73
GLUCOSE SERPL-MCNC: 96 MG/DL (ref 65–99)
HCT VFR BLD AUTO: 44.7 % (ref 34–46.6)
HGB BLD-MCNC: 14.2 G/DL (ref 12–15.9)
IMM GRANULOCYTES # BLD AUTO: 0.02 10*3/MM3 (ref 0–0.05)
IMM GRANULOCYTES NFR BLD AUTO: 0.2 % (ref 0–0.5)
LEFT ATRIUM VOLUME INDEX: 32.7 ML/M^2
LEFT ATRIUM VOLUME: 54.2 ML
LYMPHOCYTES # BLD AUTO: 2.53 10*3/MM3 (ref 0.7–3.1)
LYMPHOCYTES NFR BLD AUTO: 26.8 % (ref 19.6–45.3)
MAXIMAL PREDICTED HEART RATE: 136 BPM
MCH RBC QN AUTO: 26.5 PG (ref 26.6–33)
MCHC RBC AUTO-ENTMCNC: 31.8 G/DL (ref 31.5–35.7)
MCV RBC AUTO: 83.4 FL (ref 79–97)
MONOCYTES # BLD AUTO: 0.62 10*3/MM3 (ref 0.1–0.9)
MONOCYTES NFR BLD AUTO: 6.6 % (ref 5–12)
NEUTROPHILS NFR BLD AUTO: 6.16 10*3/MM3 (ref 1.7–7)
NEUTROPHILS NFR BLD AUTO: 65.1 % (ref 42.7–76)
NRBC BLD AUTO-RTO: 0 /100 WBC (ref 0–0.2)
PLATELET # BLD AUTO: 261 10*3/MM3 (ref 140–450)
PMV BLD AUTO: 10 FL (ref 6–12)
POTASSIUM SERPL-SCNC: 3.9 MMOL/L (ref 3.5–5.2)
RBC # BLD AUTO: 5.36 10*6/MM3 (ref 3.77–5.28)
SODIUM SERPL-SCNC: 139 MMOL/L (ref 136–145)
STRESS TARGET HR: 116 BPM
WBC NRBC COR # BLD: 9.45 10*3/MM3 (ref 3.4–10.8)

## 2022-02-06 PROCEDURE — 93306 TTE W/DOPPLER COMPLETE: CPT

## 2022-02-06 PROCEDURE — G0378 HOSPITAL OBSERVATION PER HR: HCPCS

## 2022-02-06 PROCEDURE — 25010000002 HEPARIN (PORCINE) PER 1000 UNITS: Performed by: NURSE PRACTITIONER

## 2022-02-06 PROCEDURE — 97116 GAIT TRAINING THERAPY: CPT

## 2022-02-06 PROCEDURE — 85025 COMPLETE CBC W/AUTO DIFF WBC: CPT | Performed by: NURSE PRACTITIONER

## 2022-02-06 PROCEDURE — 97161 PT EVAL LOW COMPLEX 20 MIN: CPT

## 2022-02-06 PROCEDURE — 93306 TTE W/DOPPLER COMPLETE: CPT | Performed by: INTERNAL MEDICINE

## 2022-02-06 PROCEDURE — 96366 THER/PROPH/DIAG IV INF ADDON: CPT

## 2022-02-06 PROCEDURE — 25010000002 CEFTRIAXONE PER 250 MG: Performed by: NURSE PRACTITIONER

## 2022-02-06 PROCEDURE — 97165 OT EVAL LOW COMPLEX 30 MIN: CPT

## 2022-02-06 PROCEDURE — 96372 THER/PROPH/DIAG INJ SC/IM: CPT

## 2022-02-06 PROCEDURE — 80048 BASIC METABOLIC PNL TOTAL CA: CPT | Performed by: NURSE PRACTITIONER

## 2022-02-06 PROCEDURE — 97535 SELF CARE MNGMENT TRAINING: CPT

## 2022-02-06 PROCEDURE — 97530 THERAPEUTIC ACTIVITIES: CPT

## 2022-02-06 PROCEDURE — 99226 PR SBSQ OBSERVATION CARE/DAY 35 MINUTES: CPT | Performed by: INTERNAL MEDICINE

## 2022-02-06 RX ORDER — MAGNESIUM SULFATE 1 G/100ML
1 INJECTION INTRAVENOUS AS NEEDED
Status: DISCONTINUED | OUTPATIENT
Start: 2022-02-06 | End: 2022-02-07 | Stop reason: HOSPADM

## 2022-02-06 RX ORDER — MAGNESIUM SULFATE HEPTAHYDRATE 40 MG/ML
4 INJECTION, SOLUTION INTRAVENOUS AS NEEDED
Status: DISCONTINUED | OUTPATIENT
Start: 2022-02-06 | End: 2022-02-07 | Stop reason: HOSPADM

## 2022-02-06 RX ORDER — MAGNESIUM SULFATE HEPTAHYDRATE 40 MG/ML
2 INJECTION, SOLUTION INTRAVENOUS AS NEEDED
Status: DISCONTINUED | OUTPATIENT
Start: 2022-02-06 | End: 2022-02-07 | Stop reason: HOSPADM

## 2022-02-06 RX ADMIN — ROPINIROLE HYDROCHLORIDE 1 MG: 0.5 TABLET, FILM COATED ORAL at 09:58

## 2022-02-06 RX ADMIN — Medication 10 ML: at 20:40

## 2022-02-06 RX ADMIN — CARVEDILOL 6.25 MG: 6.25 TABLET, FILM COATED ORAL at 17:38

## 2022-02-06 RX ADMIN — SENNOSIDES AND DOCUSATE SODIUM 2 TABLET: 50; 8.6 TABLET ORAL at 20:39

## 2022-02-06 RX ADMIN — ACETAMINOPHEN 650 MG: 325 TABLET, FILM COATED ORAL at 10:01

## 2022-02-06 RX ADMIN — CARVEDILOL 6.25 MG: 6.25 TABLET, FILM COATED ORAL at 09:58

## 2022-02-06 RX ADMIN — TRAZODONE HYDROCHLORIDE 100 MG: 100 TABLET ORAL at 20:39

## 2022-02-06 RX ADMIN — HEPARIN SODIUM 5000 UNITS: 5000 INJECTION, SOLUTION INTRAVENOUS; SUBCUTANEOUS at 20:40

## 2022-02-06 RX ADMIN — LEVOTHYROXINE SODIUM 112 MCG: 112 TABLET ORAL at 05:33

## 2022-02-06 RX ADMIN — LOSARTAN POTASSIUM 25 MG: 25 TABLET, FILM COATED ORAL at 09:59

## 2022-02-06 RX ADMIN — ACETAMINOPHEN 650 MG: 325 TABLET, FILM COATED ORAL at 05:33

## 2022-02-06 RX ADMIN — SODIUM CHLORIDE 1 G: 900 INJECTION INTRAVENOUS at 09:57

## 2022-02-06 RX ADMIN — ROPINIROLE HYDROCHLORIDE 1 MG: 0.5 TABLET, FILM COATED ORAL at 20:41

## 2022-02-06 RX ADMIN — MONTELUKAST SODIUM 10 MG: 10 TABLET, COATED ORAL at 20:40

## 2022-02-06 RX ADMIN — HEPARIN SODIUM 5000 UNITS: 5000 INJECTION, SOLUTION INTRAVENOUS; SUBCUTANEOUS at 09:59

## 2022-02-06 NOTE — THERAPY EVALUATION
Patient Name: Ila Galaviz  : 1935    MRN: [id number]                              Today's Date: 2022       Admit Date: 2022    Visit Dx:     ICD-10-CM ICD-9-CM   1. Acute UTI  N39.0 599.0   2. Dehydration  E86.0 276.51   3. Renal insufficiency  N28.9 593.9   4. Generalized weakness  R53.1 780.79     Patient Active Problem List   Diagnosis   • Acute UTI   • Leukocytosis   • Weakness   • CKD (chronic kidney disease) stage 3, GFR 30-59 ml/min ([facility])   • NICM (nonischemic cardiomyopathy) ([facility])   • HTN (hypertension)   • Hypothyroidism (acquired)     Past Medical History:   Diagnosis Date   • CHF (congestive heart failure) ([facility])    • Disease of thyroid gland    • Hypertension    • Kidney disease      Past Surgical History:   Procedure Laterality Date   • THYROID SURGERY        General Information     [facility] Name 22 1302          OT Time and Intention    Document Type evaluation  -     Mode of Treatment individual therapy; physical therapy  -[facility] Name 22 1302          General Information    Patient Profile Reviewed yes  -     Existing Precautions/Restrictions fall  -     Barriers to Rehab none identified  -[facility] Name 22 1302          Occupational Profile    Environmental Supports and Barriers (Occupational Profile) Pt states she has a rollator, a shower stool, and a walk in shower with grab bars.  -[facility] Name 22 1302          Living Environment    Lives With spouse  -[facility] Name 22 1302          Home Main Entrance    Number of Stairs, Main Entrance one  -SW     Row Name 22 1302          Stairs Within Home, Primary    Stairs, Within Home, Primary has a basement that the car reardon in.  -[facility] Name 22 1302          Cognition    Orientation Status (Cognition) oriented x 4  -[facility] Name 22 1302          Safety Issues, Functional Mobility    Safety Issues Affecting Function (Mobility) awareness of need for assistance; impulsivity;  safety precautions follow-through/compliance; safety precaution awareness  -     Impairments Affecting Function (Mobility) balance; endurance/activity tolerance; strength; shortness of breath  -           User Key  (r) = Recorded By, (t) = Taken By, (c) = Cosigned By    Initials Name Provider Type    Natasha Osullivan OT Occupational Therapist                 Mobility/ADL's     Row Name 02/06/22 1302          Bed Mobility    Comment (Bed Mobility) Pt UIC  -     Row Name 02/06/22 1302          Transfers    Transfers sit-stand transfer; toilet transfer  -     Sit-Stand Luce (Transfers) contact guard  -     [place] Level (Toilet Transfer) minimum assist (75% patient effort)  -     Assistive Device (Toilet Transfer) commode, bedside without drop arms  -     Row Name 02/06/22 1302          Sit-Stand Transfer    Assistive Device (Sit-Stand Transfers) walker, front-wheeled  -[facility] Name 02/06/22 1302          Toilet Transfer    Type (Toilet Transfer) sit-stand; lateral; stand-sit  -[facility] Name 02/06/22 1302          Functional Mobility    Functional Mobility- Ind. Level minimum assist (75% patient effort)  -     Functional Mobility- Device other (see comments)  HHA  -     Functional Mobility-Distance (Feet) 4  -SW     Row Name 02/06/22 1302          Activities of Daily Living    BADL Assessment/Intervention lower body dressing; grooming; toileting  -     Row Name 02/06/22 1302          Lower Body Dressing Assessment/Training    [place]uce Level (Lower Body Dressing) lower body dressing skills; socks; supervision  -     Position (Lower Body Dressing) unsupported sitting  -SW     Row Name 02/06/22 1302          Grooming Assessment/Training    [place]uce Level (Grooming) grooming skills; wash face, hands; set up  -SW     Position (Grooming) unsupported sitting  -SW     Row Name 02/06/22 1302          Toileting Assessment/Training    [place] Level (Toileting) toileting skills;  perform perineal hygiene; adjust/manage clothing; minimum assist (75% patient effort)  -     Position (Toileting) unsupported sitting  -           User Key  (r) = Recorded By, (t) = Taken By, (c) = Cosigned By    Initials Name Provider Type    Natasha Osullivan OT Occupational Therapist               Obj/Interventions     Row Name 02/06/22 1302          Sensory Assessment (Somatosensory)    Sensory Assessment (Somatosensory) UE sensation intact  -[facility] Name 02/06/22 1302          Vision Assessment/Intervention    Visual Impairment/Limitations WFL; corrective lenses full-time; other (see comments)  Macular degeneration to L eye  -     Row Name 02/06/22 1302          Range of Motion Comprehensive    General Range of Motion bilateral upper extremity ROM WNL  -[facility] Name 02/06/22 1302          Strength Comprehensive (MMT)    General Manual Muscle Testing (MMT) Assessment no strength deficits identified  -     Comment, General Manual Muscle Testing (MMT) Assessment BUEs 5/5  -     Row Name 02/06/22 1302          Balance    Balance Assessment sitting static balance; sitting dynamic balance; sit to stand dynamic balance; standing static balance; standing dynamic balance  -SW     Static Sitting Balance WFL; unsupported; sitting in chair  -SW     Dynamic Sitting Balance WFL; unsupported; sitting in chair  -SW     Sit to Stand Dynamic Balance mild impairment  -SW     Static Standing Balance WFL; supported; standing  -SW     Dynamic Standing Balance mild impairment; supported; standing  -SW     Balance Interventions sitting; standing; sit to stand; supported; static; dynamic; minimal challenge; occupation based/functional task  -           User Key  (r) = Recorded By, (t) = Taken By, (c) = Cosigned By    Initials Name Provider Type    Natasha Osullivan OT Occupational Therapist               Goals/Plan     Row Name 02/06/22 1302          Bed Mobility Goal 1 (OT)    [place] Level/Cues Needed (Bed Mobility  Goal 1, OT) independent  -SW     Time Frame (Bed Mobility Goal 1, OT) long term goal (LTG); by discharge  -SW     Progress/Outcomes (Bed Mobility Goal 1, OT) goal ongoing  -[facility] Name 02/06/22 1302          Transfer Goal 1 (OT)    Activity/Assistive Device (Transfer Goal 1, OT) transfers, all  -SW     [place] Level/Cues Needed (Transfer Goal 1, OT) set-up required  -SW     Time Frame (Transfer Goal 1, OT) long term goal (LTG); by discharge  -SW     Progress/Outcome (Transfer Goal 1, OT) goal ongoing  -[facility] Name 02/06/22 1302          Dressing Goal 1 (OT)    Activity/Device (Dressing Goal 1, OT) dressing skills, all  -SW     [place]/Cues Needed (Dressing Goal 1, OT) independent  -SW     Time Frame (Dressing Goal 1, OT) long term goal (LTG); by discharge  -SW     Progress/Outcome (Dressing Goal 1, OT) goal ongoing  -SW     Row Name 02/06/22 1302          Toileting Goal 1 (OT)    Activity/Device (Toileting Goal 1, OT) toileting skills, all  -SW     [place] Level/Cues Needed (Toileting Goal 1, OT) modified independence  -SW     Time Frame (Toileting Goal 1, OT) long term goal (LTG); by discharge  -SW     Progress/Outcome (Toileting Goal 1, OT) goal ongoing  -SW     Row Name 02/06/22 1302          Therapy Assessment/Plan (OT)    Planned Therapy Interventions (OT) activity tolerance training; adaptive equipment training; BADL retraining; functional balance retraining; transfer/mobility retraining; strengthening exercise; patient/caregiver education/training  -           User Key  (r) = Recorded By, (t) = Taken By, (c) = Cosigned By    Initials Name Provider Type    Natasha Osullivan OT Occupational Therapist               Clinical Impression     [facility] Name 02/06/22 1302          Pain Assessment    Additional Documentation Pain Scale: Numbers Pre/Post-Treatment (Group)  -SW     Row Name 02/06/22 1302          Pain Scale: Numbers Pre/Post-Treatment    Pretreatment Pain Rating 0/10 - no pain  -      Posttreatment Pain Rating 0/10 - no pain  -     Row Name 02/06/22 1302          Plan of Care Review    Plan of Care Reviewed With patient  -     Outcome Summary OT eval complete.  Pt Ox4 and rates 0/10 pain.  Pt has good UB strength but has low standing endurance and requires cga for STS and min assist for t/f to [facility].  She completed toileting with min assist, grooming and lbd with setup.  Overall, pt would benefit from IPOT and HHOT at d/c,  -     Row Name 02/06/22 1302          Therapy Assessment/Plan (OT)    Rehab Potential (OT) good, to achieve stated therapy goals  -     Criteria for Skilled Therapeutic Interventions Met (OT) yes; meets criteria; skilled treatment is necessary  -     Therapy Frequency (OT) daily  -     Row Name 02/06/22 1302          Therapy Plan Review/Discharge Plan (OT)    Anticipated Discharge Disposition (OT) home with home health  -     Row Name 02/06/22 1302          Vital Signs    Pre Systolic BP Rehab 164  -SW     Pre Treatment Diastolic BP 82  -SW     Post Systolic BP Rehab 157  -SW     Post Treatment Diastolic BP 82  -SW     Pretreatment Heart Rate (beats/min) 82  -SW     Pre SpO2 (%) 98  -SW     O2 Delivery Pre Treatment room air  -SW     O2 Delivery Intra Treatment room air  -SW     O2 Delivery Post Treatment room air  -SW     Pre Patient Position Sitting  -SW     Intra Patient Position Standing  -SW     Post Patient Position Sitting  -     Row Name 02/06/22 1302          Positioning and Restraints    Pre-Treatment Position sitting in chair/recliner  -SW     Post Treatment Position chair  -SW     In Chair notified nsg; reclined; sitting; call light within reach; encouraged to call for assist; exit alarm on; with family/caregiver; waffle cushion; legs elevated  -           User Key  (r) = Recorded By, (t) = Taken By, (c) = Cosigned By    Initials Name Provider Type    Natasha Osullivan, DIDI Occupational Therapist               Outcome Measures     Row Name 02/06/22 1309           How much help from another is currently needed...    Putting on and taking off regular lower body clothing? 4  -SW     Bathing (including washing, rinsing, and drying) 3  -SW     Toileting (which includes using toilet bed pan or urinal) 3  -SW     Putting on and taking off regular upper body clothing 3  -SW     Taking care of personal grooming (such as brushing teeth) 4  -SW     Eating meals 4  -SW     AM-PAC 6 Clicks Score (OT) 21  -SW     Row Name 02/06/22 1107 02/06/22 0800       How much help from another person do you currently need...    Turning from your back to your side while in flat bed without using bedrails? 3  -LO 3  -JA    Moving from lying on back to sitting on the side of a flat bed without bedrails? 3  -LO 3  -JA    Moving to and from a bed to a chair (including a wheelchair)? 3  -LO 3  -JA    Standing up from a chair using your arms (e.g., wheelchair, bedside chair)? 3  -LO 3  -JA    Climbing 3-5 steps with a railing? 3  -LO 2  -JA    To walk in hospital room? 3  -LO 3  -JA    AM-PAC 6 Clicks Score (PT) 18  -LO 17  -JA    Row Name 02/06/22 1302 02/06/22 1107       Functional Assessment    Outcome Measure Options AM-PAC 6 Clicks Daily Activity (OT)  - AM-PAC 6 Clicks Basic Mobility (PT)  -          User Key  (r) = Recorded By, (t) = Taken By, (c) = Cosigned By    Initials Name Provider Type    Laura Dorantes, RN Registered Nurse    Natasha Osullivan OT Occupational Therapist    Sheila De León, PT Physical Therapist                Occupational Therapy Education                 Title: PT OT SLP Therapies (In Progress)     Topic: Occupational Therapy (In Progress)     Point: ADL training (Done)     Description:   Instruct learner(s) on proper safety adaptation and remediation techniques during self care or transfers.   Instruct in proper use of assistive devices.              Learning Progress Summary           Patient Acceptance, E, VU by TEA at 2/6/2022 4030                   Point: Home  exercise program (Not Started)     Description:   Instruct learner(s) on appropriate technique for monitoring, assisting and/or progressing therapeutic exercises/activities.              Learner Progress:  Not documented in this visit.          Point: Precautions (Done)     Description:   Instruct learner(s) on prescribed precautions during self-care and functional transfers.              Learning Progress Summary           Patient Acceptance, ROZINA, VU by  at 2/6/2022 1347                   Point: Body mechanics (Not Started)     Description:   Instruct learner(s) on proper positioning and spine alignment during self-care, functional mobility activities and/or exercises.              Learner Progress:  Not documented in this visit.                      User Key     Initials Effective Dates Name Provider Type Discipline     06/16/21 -  Natasha Cavazos, DIDI Occupational Therapist OT              OT Recommendation and Plan  Planned Therapy Interventions (OT): activity tolerance training, adaptive equipment training, BADL retraining, functional balance retraining, transfer/mobility retraining, strengthening exercise, patient/caregiver education/training  Therapy Frequency (OT): daily  Plan of Care Review  Plan of Care Reviewed With: patient  Outcome Summary: OT eval complete.  Pt Ox4 and rates 0/10 pain.  Pt has good UB strength but has low standing endurance and requires cga for STS and min assist for t/f to [facility].  She completed toileting with min assist, grooming and lbd with setup.  Overall, pt would benefit from IPOT and HHOT at d/c,     Time Calculation:    Time Calculation- OT     Row Name 02/06/22 1302 02/06/22 1015          Time Calculation- OT    OT Start Time 1302  -SW --     OT Received On 02/06/22 -SW --     OT Goal Re-Cert Due Date 02/16/22 -SW --            Timed Charges    03998 - Gait Training Minutes  -- 8  -LO     89711 - OT Self Care/Mgmt Minutes 16 -SW --            Untimed Charges    OT Eval/Re-eval  Minutes 38  -SW --            Total Minutes    Timed Charges Total Minutes 16  -SW 8  -LO     Untimed Charges Total Minutes 38  -SW --      Total Minutes 54  -SW 8  -LO           User Key  (r) = Recorded By, (t) = Taken By, (c) = Cosigned By    Initials Name Provider Type    Natasha Osullivan OT Occupational Therapist    Sheila De León, PT Physical Therapist              Therapy Charges for Today     Code Description Service Date Service Provider Modifiers Qty    [id number]  OT SELF CARE/MGMT/TRAIN EA 15 MIN 2/6/2022 Natasha Cavazos OT GO 1    [id number]  OT EVAL LOW COMPLEXITY 3 2/6/2022 Natasha Cavazos OT GO 1               Natasha Cavazos OT  2/6/2022

## 2022-02-06 NOTE — THERAPY EVALUATION
Patient Name: Ila Galaviz  : 1935    MRN: [id number]                              Today's Date: 2022       Admit Date: 2022    Visit Dx:     ICD-10-CM ICD-9-CM   1. Acute UTI  N39.0 599.0   2. Dehydration  E86.0 276.51   3. Renal insufficiency  N28.9 593.9   4. Generalized weakness  R53.1 780.79     Patient Active Problem List   Diagnosis   • Acute UTI   • Leukocytosis   • Weakness   • CKD (chronic kidney disease) stage 3, GFR 30-59 ml/min ([facility])   • NICM (nonischemic cardiomyopathy) ([facility])   • HTN (hypertension)   • Hypothyroidism (acquired)     Past Medical History:   Diagnosis Date   • CHF (congestive heart failure) ([facility])    • Disease of thyroid gland    • Hypertension    • Kidney disease      Past Surgical History:   Procedure Laterality Date   • THYROID SURGERY        General Information     Row Name 22 1054          Physical Therapy Time and Intention    Document Type evaluation  -LO     Mode of Treatment individual therapy; physical therapy  -LO     Row Name 22 1054          General Information    Patient Profile Reviewed yes  -LO     Prior Level of Function independent:; gait; transfer; all household mobility  -LO     Existing Precautions/Restrictions fall  -LO     Barriers to Rehab none identified  -LO     Row Name 22 1054          Living Environment    Lives With spouse  -LO     Row Name 22 1054          Home Main Entrance    Number of Stairs, Main Entrance one  -LO     Stair Railings, Main Entrance none  -LO     Row Name 22 1054          Stairs Within Home, Primary    Number of Stairs, Within Home, Primary none  -LO     Row Name 22 1054          Cognition    Orientation Status (Cognition) oriented x 4  -LO     Row Name 22 1054          Safety Issues, Functional Mobility    Safety Issues Affecting Function (Mobility) impulsivity; awareness of need for assistance  -LO     Impairments Affecting Function (Mobility) balance; endurance/activity  tolerance; strength; shortness of breath  -LO           User Key  (r) = Recorded By, (t) = Taken By, (c) = Cosigned By    Initials Name Provider Type    Sheila De León PT Physical Therapist               Mobility     Row Name 02/06/22 1055          Bed Mobility    Bed Mobility supine-sit  -LO     Supine-Sit [place] (Bed Mobility) contact guard  -LO     Assistive Device (Bed Mobility) bed rails; head of bed elevated  -LO     Comment (Bed Mobility) vc for sequencing, no physical assist required  -LO     Row Name 02/06/22 1055          Transfers    Comment (Transfers) EOB>FWW>recliner>BSC>recliner with CGA and vc for safe hand placement  -LO     Row Name 02/06/22 1055          Bed-Chair Transfer    Bed-Chair [place] (Transfers) not tested  -LO     Row Name 02/06/22 1055          Sit-Stand Transfer    Sit-Stand [place] (Transfers) contact guard  -LO     Assistive Device (Sit-Stand Transfers) walker, front-wheeled  -LO     Row Name 02/06/22 1055          Gait/Stairs (Locomotion)    [place] Level (Gait) contact guard; verbal cues  -LO     Assistive Device (Gait) walker, front-wheeled  -LO     Distance in Feet (Gait) 30  -LO     Deviations/Abnormal Patterns (Gait) stride length decreased; steppage; gait speed decreased; roberto carlos decreased  -LO     Bilateral Gait Deviations forward flexed posture  -LO     [place] Level (Stairs) not tested  -LO     Comment (Gait/Stairs) Patient ambulates x 30 ' with FWW CGA with vc for maintaining upright posture. Requires encouragement throughout as she states her legs feel week.  -     Row Name 02/06/22 1055          Mobility    Extremity Weight-bearing Status --  no restrictions noted  -LO           User Key  (r) = Recorded By, (t) = Taken By, (c) = Cosigned By    Initials Name Provider Type    Sheila De León PT Physical Therapist               Obj/Interventions     Row Name 02/06/22 1059          Range of Motion Comprehensive    General Range of Motion  bilateral lower extremity ROM WNL  -LO     Row Name 02/06/22 1059          Strength Comprehensive (MMT)    General Manual Muscle Testing (MMT) Assessment lower extremity strength deficits identified  -     Comment, General Manual Muscle Testing (MMT) Assessment BLE grossly 4/5  -LO     Row Name 02/06/22 1059          Motor Skills    Motor Skills functional endurance  -LO     Functional Endurance tolerates 2-3 min of continuous actrivity before requires a rest break  -LO     Row Name 02/06/22 1059          Balance    Balance Assessment sitting static balance; sitting dynamic balance; standing static balance; standing dynamic balance  -LO     Static Sitting Balance WFL; unsupported; sitting, edge of bed  -LO     Dynamic Sitting Balance WFL; unsupported; sitting, edge of bed  -LO     Static Standing Balance WFL; standing; supported  -LO     Dynamic Standing Balance WFL; supported; standing  -LO     Comment, Balance Requires FWW and CGA for safety in standing  -LO     Row Name 02/06/22 1059          Sensory Assessment (Somatosensory)    Sensory Assessment (Somatosensory) other (see comments)  peripheral neuropathy  -LO           User Key  (r) = Recorded By, (t) = Taken By, (c) = Cosigned By    Initials Name Provider Type    Sheila De León, PT Physical Therapist               Goals/Plan     Row Name 02/06/22 1104          Transfer Goal 1 (PT)    Activity/Assistive Device (Transfer Goal 1, PT) sit-to-stand/stand-to-sit; bed-to-chair/chair-to-bed; car transfer  -LO     [place] Level/Cues Needed (Transfer Goal 1, PT) independent  -LO     Time Frame (Transfer Goal 1, PT) long term goal (LTG); 10 days  -     Row Name 02/06/22 1104          Gait Training Goal 1 (PT)    Activity/Assistive Device (Gait Training Goal 1, PT) gait (walking locomotion); assistive device use; decrease fall risk; diminish gait deviation; increase endurance/gait distance  -LO     [place] Level (Gait Training Goal 1, PT) modified  independence  -LO     Distance (Gait Training Goal 1, PT) 100  -LO     Time Frame (Gait Training Goal 1, PT) long term goal (LTG); 10 days  -LO     Row Name 02/06/22 1104          Stairs Goal 1 (PT)    Activity/Assistive Device (Stairs Goal 1, PT) ascending stairs; descending stairs  -LO     [place] Level/Cues Needed (Stairs Goal 1, PT) contact guard assist  -LO     Number of Stairs (Stairs Goal 1, PT) 1  -LO     Time Frame (Stairs Goal 1, PT) long term goal (LTG); 10 days  -LO     Row Name 02/06/22 1104          Patient Education Goal (PT)    Activity (Patient Education Goal, PT) Patient will demonstrate safe and effective sequencing for transfers.  -LO     Time Frame (Patient Education Goal, PT) long term goal (LTG); 10 days  -LO           User Key  (r) = Recorded By, (t) = Taken By, (c) = Cosigned By    Initials Name Provider Type    Sheila De León, PT Physical Therapist               Clinical Impression     Row Name 02/06/22 1101          Pain    Additional Documentation Pain Scale: FACES Pre/Post-Treatment (Group)  -     Row Name 02/06/22 1101          Pain Scale: FACES Pre/Post-Treatment    Pain: FACES Scale, Pretreatment 0-->no hurt  -LO     Posttreatment Pain Rating 0-->no hurt  -LO     Row Name 02/06/22 1101          Plan of Care Review    Plan of Care Reviewed With patient  -     Progress no change  -     Outcome Summary PT eval completed. Patient alert and oriented x4. Presents with deficits in standing balance, gait quality, general BLE strength, and functional endurance effecting functional mobliity below baseline. Performs bed mobility, transfers, and ambulation with CGA with FWW. Ambulates x 30' with FWW CGA, but requires encouragement throughout gait as reports legs feel weak. Patient will benefit from skilled IP PT services to addres impairments in order to return to PLOF. Recommend home with assist and HHPT at [place].  -     Row Name 02/06/22 1101          Therapy Assessment/Plan (PT)     Patient/Family Therapy Goals Statement (PT) return home  -LO     Rehab Potential (PT) good, to achieve stated therapy goals  -LO     Criteria for Skilled Interventions Met (PT) yes; skilled treatment is necessary  -LO     Row Name 02/06/22 1101          Vital Signs    Pre Systolic BP Rehab 148  -LO     Pre Treatment Diastolic BP 86  -LO     Pretreatment Heart Rate (beats/min) 79  -LO     Pre SpO2 (%) 95  -LO     O2 Delivery Pre Treatment room air  -LO     O2 Delivery Intra Treatment room air  -LO     O2 Delivery Post Treatment room air  -LO     Pre Patient Position Supine  -LO     Intra Patient Position Standing  -LO     Post Patient Position Sitting  -LO     Rest Breaks  1  -LO     Row Name 02/06/22 1101          Positioning and Restraints    Pre-Treatment Position in bed  -LO     Post Treatment Position chair  -LO     In Chair notified nsg; reclined; call light within reach; encouraged to call for assist; exit alarm on; with family/caregiver; waffle cushion  -LO           User Key  (r) = Recorded By, (t) = Taken By, (c) = Cosigned By    Initials Name Provider Type    Sheila De León, PT Physical Therapist               Outcome Measures     Row Name 02/06/22 1107          How much help from another person do you currently need...    Turning from your back to your side while in flat bed without using bedrails? 3  -LO     Moving from lying on back to sitting on the side of a flat bed without bedrails? 3  -LO     Moving to and from a bed to a chair (including a wheelchair)? 3  -LO     Standing up from a chair using your arms (e.g., wheelchair, bedside chair)? 3  -LO     Climbing 3-5 steps with a railing? 3  -LO     To walk in hospital room? 3  -LO     AM-PAC 6 Clicks Score (PT) 18  -LO     Row Name 02/06/22 1107          Functional Assessment    Outcome Measure Options AM-PAC 6 Clicks Basic Mobility (PT)  -LO           User Key  (r) = Recorded By, (t) = Taken By, (c) = Cosigned By    Initials Name Provider Type    ANH  Sheila Solo, PT Physical Therapist                             Physical Therapy Education                 Title: PT OT SLP Therapies (Done)     Topic: Physical Therapy (Done)     Point: Mobility training (Done)     Learning Progress Summary           Patient Acceptance, E, VU,NR by  at 2/6/2022 1015    Comment: Patient education regarding sequencing for transfers and importance for ambulation to improve functional mobility.                   Point: Home exercise program (Done)     Learning Progress Summary           Patient Acceptance, E, VU,NR by  at 2/6/2022 1015    Comment: Patient education regarding sequencing for transfers and importance for ambulation to improve functional mobility.                   Point: Body mechanics (Done)     Learning Progress Summary           Patient Acceptance, E, VU,NR by  at 2/6/2022 1015    Comment: Patient education regarding sequencing for transfers and importance for ambulation to improve functional mobility.                   Point: Precautions (Done)     Learning Progress Summary           Patient Acceptance, E, VU,NR by  at 2/6/2022 1015    Comment: Patient education regarding sequencing for transfers and importance for ambulation to improve functional mobility.                               User Key     Initials Effective Dates Name Provider Type Discipline     06/16/21 -  Sheila Solo, PT Physical Therapist PT              PT Recommendation and Plan  Planned Therapy Interventions (PT): balance training, gait training, home exercise program, patient/family education, neuromuscular re-education, stair training, strengthening, transfer training  Plan of Care Reviewed With: patient  Progress: no change  Outcome Summary: PT eval completed. Patient alert and oriented x4. Presents with deficits in standing balance, gait quality, general BLE strength, and functional endurance effecting functional mobliity below baseline. Performs bed mobility, transfers, and ambulation  with CGA with FWW. Ambulates x 30' with FWW CGA, but requires encouragement throughout gait as reports legs feel weak. Patient will benefit from skilled IP PT services to addres impairments in order to return to PLOF. Recommend home with assist and HHPT at [place].     Time Calculation:    PT Charges     Row Name 02/06/22 1015             Time Calculation    Start Time 1015  -LO      PT Received On 02/06/22  -LO      PT Goal Re-Cert Due Date 02/16/22  -LO              Timed Charges    71356 - Gait Training Minutes  8  -LO      14609 - PT Therapeutic Activity Minutes 15  -LO              Untimed Charges    PT Eval/Re-eval Minutes 40  -LO              Total Minutes    Timed Charges Total Minutes 23  -LO      Untimed Charges Total Minutes 40  -LO       Total Minutes 63  -LO            User Key  (r) = Recorded By, (t) = Taken By, (c) = Cosigned By    Initials Name Provider Type    LO Sheila Solo, PT Physical Therapist              Therapy Charges for Today     Code Description Service Date Service Provider Modifiers Qty    60070762445 HC GAIT TRAINING EA 15 MIN 2/6/2022 Sheila Solo, PT GP 1    67810370408 HC PT THERAPEUTIC ACT EA 15 MIN 2/6/2022 Sheila Solo, PT GP 1    62061603282 HC PT EVAL LOW COMPLEXITY 3 2/6/2022 Sheila Solo, PT GP 1          PT G-Codes  Outcome Measure Options: AM-PAC 6 Clicks Basic Mobility (PT)  AM-PAC 6 Clicks Score (PT): 18    Sheila Solo, PT  2/6/2022

## 2022-02-06 NOTE — PLAN OF CARE
Goal Outcome Evaluation:  Plan of Care Reviewed With: patient        Progress: no change  Outcome Summary: PT eval completed. Patient alert and oriented x4. Presents with deficits in standing balance, gait quality, general BLE strength, and functional endurance effecting functional mobliity below baseline. Performs bed mobility, transfers, and ambulation with CGA with FWW. Ambulates x 30' with FWW CGA, but requires encouragement throughout gait as reports legs feel weak. Patient will benefit from skilled IP PT services to addres impairments in order to return to PLOF. Recommend home with assist and HHPT at [place].

## 2022-02-06 NOTE — PROGRESS NOTES
[facility] Medicine Services  PROGRESS NOTE    Patient Name: Ila Galaviz  : 1935  MRN: [id number]    Date of Admission: 2022  Primary Care Physician: Erin Rubio APRN    Subjective   Subjective     CC:  F/U weakness    HPI:  Complained of restless legs overnight.    ROS:  Gen- No fevers, chills  MSK- As above    Objective   Objective     Vital Signs:   Temp:  [97.7 °F (36.5 °C)-97.8 °F (36.6 °C)] 97.8 °F (36.6 °C)  Heart Rate:  [61-88] 68  Resp:  [15-18] 18  BP: (140-179)/() 144/80     Physical Exam:  Constitutional: No acute distress, awake, alert, laying in bed  HENT: NCAT, mucous membranes moist  Respiratory: Clear to auscultation bilaterally, respiratory effort normal   Cardiovascular: RRR, no murmurs, rubs, or gallops  Gastrointestinal: Positive bowel sounds, soft, nontender, nondistended  Musculoskeletal: No bilateral ankle edema  Psychiatric: Appropriate affect, cooperative  Neurologic: Cranial Nerves grossly intact to confrontation, speech clear  Skin: No rashes on exposed surfaces    Results Reviewed:  LAB RESULTS:      Lab 22  0736 22  1713 22  1500   WBC 9.45  --  12.66*   HEMOGLOBIN 14.2  --  15.2   HEMATOCRIT 44.7  --  45.6   PLATELETS 261  --  293   NEUTROS ABS 6.16  --  8.59*   IMMATURE GRANS (ABS) 0.02  --  0.04   LYMPHS ABS 2.53  --  3.15*   MONOS ABS 0.62  --  0.82   EOS ABS 0.09  --  0.04   MCV 83.4  --  79.7   LACTATE  --  1.0  --          Lab 22  0736 22  1500   SODIUM 139 138   POTASSIUM 3.9 4.2   CHLORIDE 107 104   CO2 20.0* 22.0   ANION GAP 12.0 12.0   BUN 39* 45*   CREATININE 1.49* 1.44*   GLUCOSE 96 108*   CALCIUM 8.6 9.6   MAGNESIUM  --  2.1   TSH  --  3.580         Lab 22  1500   TOTAL PROTEIN 7.9   ALBUMIN 4.40   GLOBULIN 3.5   ALT (SGPT) 14   AST (SGOT) 14   BILIRUBIN 0.6   ALK PHOS 65                     Brief Urine Lab Results  (Last result in the past 365 days)      Color   Clarity   Blood   Leuk  Est   Nitrite   Protein   CREAT   Urine HCG        02/05/22 1537 Yellow   Clear   Trace   Small (1+)   Positive   100 mg/dL (2+)                 Microbiology Results Abnormal     Procedure Component Value - Date/Time    COVID PRE-OP / PRE-PROCEDURE SCREENING ORDER (NO ISOLATION) - Swab, Nasopharynx [[id number]]  (Normal) Collected: 02/05/22 1500    Lab Status: Final result Specimen: Swab from Nasopharynx Updated: 02/05/22 1554    Narrative:      The following orders were created for panel order COVID PRE-OP / PRE-PROCEDURE SCREENING ORDER (NO ISOLATION) - Swab, Nasopharynx.  Procedure                               Abnormality         Status                     ---------                               -----------         ------                     COVID-19 and FLU A/B PCR...[[id number]]  Normal              Final result                 Please view results for these tests on the individual orders.    COVID-19 and FLU A/B PCR - Swab, Nasopharynx [[id number]]  (Normal) Collected: 02/05/22 1500    Lab Status: Final result Specimen: Swab from Nasopharynx Updated: 02/05/22 1554     COVID19 Not Detected     Influenza A PCR Not Detected     Influenza B PCR Not Detected    Narrative:      Fact sheet for providers: https://www.fda.gov/media/535976/download    Fact sheet for patients: https://www.fda.gov/media/093745/download    Test performed by PCR.          XR Chest 1 View    Result Date: 2/5/2022  EXAMINATION: XR CHEST 1 VW-  INDICATION: weak  COMPARISON: NONE  FINDINGS: Left chest wall ICD noted in place. Mild chronic changes of the lung fields are present without focal airspace opacity. There is no significant pleural effusion or distinct pneumothorax. Normal heart and mediastinal contours.      Impression: Mild chronic changes of the lung fields without evidence of acute cardiopulmonary abnormality.  This report was finalized on 2/5/2022 4:15 PM by Wild Rea.            I have reviewed the medications:  Scheduled  Meds:carvedilol, 6.25 mg, Oral, BID With Meals  cefTRIAXone, 1 g, Intravenous, Q24H  heparin (porcine), 5,000 Units, Subcutaneous, Q12H  levothyroxine, 112 mcg, Oral, Daily  losartan, 25 mg, Oral, Q24H  montelukast, 10 mg, Oral, Nightly  rOPINIRole, 1 mg, Oral, BID  senna-docusate sodium, 2 tablet, Oral, BID  sodium chloride, 10 mL, Intravenous, Q12H  traZODone, 100 mg, Oral, Nightly      Continuous Infusions:   PRN Meds:.•  acetaminophen **OR** acetaminophen **OR** acetaminophen  •  senna-docusate sodium **AND** polyethylene glycol **AND** bisacodyl **AND** bisacodyl  •  magnesium sulfate **OR** magnesium sulfate **OR** magnesium sulfate  •  ondansetron **OR** ondansetron  •  potassium chloride **OR** potassium chloride **OR** potassium chloride  •  sodium chloride    Assessment/Plan   Assessment & Plan     Active Hospital Problems    Diagnosis  POA   • **Acute UTI [N39.0]  Yes   • Leukocytosis [D72.829]  Yes   • Weakness [R53.1]  Yes   • CKD (chronic kidney disease) stage 3, GFR 30-59 ml/min ([facility]) [N18.30]  Yes   • NICM (nonischemic cardiomyopathy) ([facility]) [I42.8]  Yes   • HTN (hypertension) [I10]  Yes   • Hypothyroidism (acquired) [E03.9]  Yes      Resolved Hospital Problems   No resolved problems to display.        Brief Hospital Course to date:  Ila Galaviz is a 86 y.o. female with NICM, SSS s/p pacemaker, HTN, CKD, hypothyroidism, peripheral neuropathy who presented to the ED with generalized weakness after discontinuing her lyrica.  Found to have UTI.    This patient's problems and plans were partially entered by my partner and updated as appropriate by me 02/06/22.    Acute UTI  Leukocytosis-resolved  -Urine and blood cultures pending  -Started on Rocephin       Weakness  -Possibly due to UTI  -PT/OT consults     CKD   -Unclear baseline  -Creatinine 1.44 on presentation  -Avoid nephrotoxins  -s/p 1L NS bolus in ED     NICM   SSS s/p PPM  -Follows with Kesha LOREDO at [facility]  -EF 25-30% per [facility] in  2018 per chart review  -Echo ordered     HTN  -Continue home Coreg, Losartan     Hypothyroidism  -TSH 3.580  -Continue Synthroid    DVT prophylaxis:  Medical DVT prophylaxis orders are present.          Disposition: I expect the patient to be discharged 1-2 days depending on dispo.    CODE STATUS:   Code Status and Medical Interventions:   Ordered at: 02/05/22 1743     Level Of Support Discussed With:    Patient     Code Status (Patient has no pulse and is not breathing):    CPR (Attempt to Resuscitate)     Medical Interventions (Patient has pulse or is breathing):    Full Support       Dianne Pate MD  02/06/22

## 2022-02-06 NOTE — PLAN OF CARE
Goal Outcome Evaluation:  Plan of Care Reviewed With: patient           Outcome Summary: OT eval complete.  Pt Ox4 and rates 0/10 pain.  Pt has good UB strength but has low standing endurance and requires cga for STS and min assist for t/f to bsc.  She completed toileting with min assist, grooming and lbd with setup.  Overall, pt would benefit from IPOT and HHOT at d/c,

## 2022-02-06 NOTE — PLAN OF CARE
Problem: Adult Inpatient Plan of Care  Goal: Plan of Care Review  2/6/2022 0711 by Wilner Menon RN  Outcome: Ongoing, Progressing  Flowsheets (Taken 2/6/2022 0711)  Progress: improving  2/6/2022 0710 by Wilner Menon RN  Outcome: Ongoing, Not Progressing  Flowsheets (Taken 2/6/2022 0710)  Progress: no change  Goal: Patient-Specific Goal (Individualized)  2/6/2022 0711 by Wilner Menon RN  Outcome: Ongoing, Progressing  2/6/2022 0710 by Wilner Menon RN  Outcome: Ongoing, Not Progressing  Goal: Absence of Hospital-Acquired Illness or Injury  2/6/2022 0711 by Wilner Menon RN  Outcome: Ongoing, Progressing  2/6/2022 0710 by Wilner Menon RN  Outcome: Ongoing, Not Progressing  Goal: Optimal Comfort and Wellbeing  2/6/2022 0711 by Wilner Menon RN  Outcome: Ongoing, Progressing  2/6/2022 0710 by Wilner Menon RN  Outcome: Ongoing, Not Progressing  Intervention: Provide Person-Centered Care  Recent Flowsheet Documentation  Taken 2/5/2022 2100 by Wilner Menon RN  Trust Relationship/Rapport:   care explained   choices provided   empathic listening provided   questions answered   questions encouraged   reassurance provided  Goal: Readiness for Transition of Care  2/6/2022 0711 by Wilner Menon RN  Outcome: Ongoing, Progressing  2/6/2022 0710 by Wilner Menon RN  Outcome: Ongoing, Not Progressing  Intervention: Mutually Develop Transition Plan  Recent Flowsheet Documentation  Taken 2/6/2022 0128 by Wilner Menon RN  Transportation Anticipated: car, drives self  Patient/Family Anticipated Services at Transition: none  Patient/Family Anticipates Transition to: home with family  Taken 2/6/2022 0125 by Wilner Menon RN  Equipment Currently Used at Home: rollator     Problem: Skin Injury Risk Increased  Goal: Skin Health and Integrity  2/6/2022 0711 by Wilner Menon RN  Outcome: Ongoing, Progressing  2/6/2022 0710 by Wilner Menon  "RN  Outcome: Ongoing, Not Progressing   Goal Outcome Evaluation:      Pt VSS. Patient complained of intermittent pain r/t restless leg syndrome. Pain subsided with home dose of REQUIP. Pt slept and woke up moaning with more leg pain. Despite ROM, pain did not subside. When asked what she does when she experiences this at home, she stated \"she just takes her leg pain meds\". Pt also complained of pain that was described as gas pain, but pain subsided when suggested to lay on her side. No other complaints. Will continue to monitor     Progress: improving     "

## 2022-02-07 ENCOUNTER — HOME HEALTH ADMISSION (OUTPATIENT)
Dept: HOME HEALTH SERVICES | Facility: HOME HEALTHCARE | Age: 87
End: 2022-02-07

## 2022-02-07 VITALS
BODY MASS INDEX: 23.18 KG/M2 | WEIGHT: 135.8 LBS | OXYGEN SATURATION: 95 % | RESPIRATION RATE: 18 BRPM | TEMPERATURE: 98.4 F | HEIGHT: 64 IN | SYSTOLIC BLOOD PRESSURE: 154 MMHG | DIASTOLIC BLOOD PRESSURE: 74 MMHG | HEART RATE: 94 BPM

## 2022-02-07 LAB
ANION GAP SERPL CALCULATED.3IONS-SCNC: 12 MMOL/L (ref 5–15)
BACTERIA SPEC AEROBE CULT: ABNORMAL
BUN SERPL-MCNC: 42 MG/DL (ref 8–23)
BUN/CREAT SERPL: 25.6 (ref 7–25)
CALCIUM SPEC-SCNC: 8.4 MG/DL (ref 8.6–10.5)
CHLORIDE SERPL-SCNC: 106 MMOL/L (ref 98–107)
CO2 SERPL-SCNC: 20 MMOL/L (ref 22–29)
CREAT SERPL-MCNC: 1.64 MG/DL (ref 0.57–1)
DEPRECATED RDW RBC AUTO: 47.1 FL (ref 37–54)
ERYTHROCYTE [DISTWIDTH] IN BLOOD BY AUTOMATED COUNT: 15.6 % (ref 12.3–15.4)
FERRITIN SERPL-MCNC: 112.6 NG/ML (ref 13–150)
GFR SERPL CREATININE-BSD FRML MDRD: 30 ML/MIN/1.73
GLUCOSE SERPL-MCNC: 104 MG/DL (ref 65–99)
HCT VFR BLD AUTO: 43.1 % (ref 34–46.6)
HGB BLD-MCNC: 13.8 G/DL (ref 12–15.9)
MCH RBC QN AUTO: 26.5 PG (ref 26.6–33)
MCHC RBC AUTO-ENTMCNC: 32 G/DL (ref 31.5–35.7)
MCV RBC AUTO: 82.9 FL (ref 79–97)
PLATELET # BLD AUTO: 241 10*3/MM3 (ref 140–450)
PMV BLD AUTO: 9.9 FL (ref 6–12)
POTASSIUM SERPL-SCNC: 3.7 MMOL/L (ref 3.5–5.2)
RBC # BLD AUTO: 5.2 10*6/MM3 (ref 3.77–5.28)
SODIUM SERPL-SCNC: 138 MMOL/L (ref 136–145)
WBC NRBC COR # BLD: 10.83 10*3/MM3 (ref 3.4–10.8)

## 2022-02-07 PROCEDURE — 96372 THER/PROPH/DIAG INJ SC/IM: CPT

## 2022-02-07 PROCEDURE — 96366 THER/PROPH/DIAG IV INF ADDON: CPT

## 2022-02-07 PROCEDURE — 82728 ASSAY OF FERRITIN: CPT | Performed by: INTERNAL MEDICINE

## 2022-02-07 PROCEDURE — 25010000002 CEFTRIAXONE PER 250 MG: Performed by: NURSE PRACTITIONER

## 2022-02-07 PROCEDURE — 99217 PR OBSERVATION CARE DISCHARGE MANAGEMENT: CPT | Performed by: INTERNAL MEDICINE

## 2022-02-07 PROCEDURE — 85027 COMPLETE CBC AUTOMATED: CPT | Performed by: INTERNAL MEDICINE

## 2022-02-07 PROCEDURE — 80048 BASIC METABOLIC PNL TOTAL CA: CPT | Performed by: INTERNAL MEDICINE

## 2022-02-07 PROCEDURE — G0378 HOSPITAL OBSERVATION PER HR: HCPCS

## 2022-02-07 PROCEDURE — 25010000002 HEPARIN (PORCINE) PER 1000 UNITS: Performed by: NURSE PRACTITIONER

## 2022-02-07 RX ORDER — ACETAMINOPHEN 500 MG
1000 TABLET ORAL EVERY 6 HOURS PRN
Status: DISCONTINUED | OUTPATIENT
Start: 2022-02-07 | End: 2022-02-07 | Stop reason: HOSPADM

## 2022-02-07 RX ORDER — PREGABALIN 50 MG/1
50 CAPSULE ORAL EVERY 12 HOURS SCHEDULED
Status: DISCONTINUED | OUTPATIENT
Start: 2022-02-07 | End: 2022-02-07 | Stop reason: HOSPADM

## 2022-02-07 RX ORDER — ACETAMINOPHEN 160 MG/5ML
650 SOLUTION ORAL EVERY 6 HOURS PRN
Status: DISCONTINUED | OUTPATIENT
Start: 2022-02-07 | End: 2022-02-07 | Stop reason: HOSPADM

## 2022-02-07 RX ORDER — CEFDINIR 300 MG/1
300 CAPSULE ORAL DAILY
Qty: 2 CAPSULE | Refills: 0 | Status: SHIPPED | OUTPATIENT
Start: 2022-02-08 | End: 2022-02-10

## 2022-02-07 RX ORDER — ACETAMINOPHEN 650 MG/1
650 SUPPOSITORY RECTAL EVERY 6 HOURS PRN
Status: DISCONTINUED | OUTPATIENT
Start: 2022-02-07 | End: 2022-02-07 | Stop reason: HOSPADM

## 2022-02-07 RX ADMIN — ROPINIROLE HYDROCHLORIDE 1 MG: 0.5 TABLET, FILM COATED ORAL at 09:44

## 2022-02-07 RX ADMIN — CARVEDILOL 6.25 MG: 6.25 TABLET, FILM COATED ORAL at 09:43

## 2022-02-07 RX ADMIN — LEVOTHYROXINE SODIUM 112 MCG: 112 TABLET ORAL at 05:23

## 2022-02-07 RX ADMIN — SODIUM CHLORIDE 1 G: 900 INJECTION INTRAVENOUS at 09:44

## 2022-02-07 RX ADMIN — ACETAMINOPHEN 1000 MG: 325 TABLET, FILM COATED ORAL at 12:33

## 2022-02-07 RX ADMIN — LOSARTAN POTASSIUM 25 MG: 25 TABLET, FILM COATED ORAL at 09:43

## 2022-02-07 RX ADMIN — HEPARIN SODIUM 5000 UNITS: 5000 INJECTION, SOLUTION INTRAVENOUS; SUBCUTANEOUS at 09:43

## 2022-02-07 RX ADMIN — ACETAMINOPHEN 650 MG: 325 TABLET, FILM COATED ORAL at 05:23

## 2022-02-07 NOTE — PLAN OF CARE
Problem: Adult Inpatient Plan of Care  Goal: Plan of Care Review  Outcome: Ongoing, Progressing  Goal: Patient-Specific Goal (Individualized)  Outcome: Ongoing, Progressing  Goal: Absence of Hospital-Acquired Illness or Injury  Outcome: Ongoing, Progressing  Intervention: Identify and Manage Fall Risk  Recent Flowsheet Documentation  Taken 2/7/2022 0400 by Lizbet Thomas RN  Safety Promotion/Fall Prevention:   activity supervised   clutter free environment maintained   assistive device/personal items within reach   fall prevention program maintained   lighting adjusted   nonskid shoes/slippers when out of bed   room organization consistent   safety round/check completed  Taken 2/7/2022 0000 by Lizbet Thomas RN  Safety Promotion/Fall Prevention:   activity supervised   assistive device/personal items within reach   clutter free environment maintained   fall prevention program maintained   lighting adjusted   nonskid shoes/slippers when out of bed   safety round/check completed   room organization consistent  Taken 2/6/2022 2000 by Lizbet Thomas RN  Safety Promotion/Fall Prevention:   activity supervised   assistive device/personal items within reach   clutter free environment maintained   fall prevention program maintained   lighting adjusted   nonskid shoes/slippers when out of bed   room organization consistent   safety round/check completed  Intervention: Prevent Skin Injury  Recent Flowsheet Documentation  Taken 2/7/2022 0400 by Lizbet Thomas, RN  Body Position: position changed independently  Skin Protection:   adhesive use limited   tubing/devices free from skin contact   skin-to-skin areas padded   skin-to-device areas padded   incontinence pads utilized  Taken 2/7/2022 0000 by Lizbet Thomas, RN  Body Position: position changed independently  Skin Protection:   adhesive use limited   tubing/devices free from skin contact   skin-to-skin areas padded   skin-to-device areas padded    incontinence pads utilized  Taken 2/6/2022 2000 by Lizbet Thomas RN  Body Position: position changed independently  Skin Protection:   adhesive use limited   tubing/devices free from skin contact   skin-to-skin areas padded   skin-to-device areas padded   incontinence pads utilized  Intervention: Prevent and Manage VTE (venous thromboembolism) Risk  Recent Flowsheet Documentation  Taken 2/6/2022 2000 by Lizbet Thomas RN  VTE Prevention/Management: (Heparin)   bilateral   dorsiflexion/plantar flexion performed   other (see comments)  Intervention: Prevent Infection  Recent Flowsheet Documentation  Taken 2/7/2022 0400 by Lizbet Thomas RN  Infection Prevention:   environmental surveillance performed   equipment surfaces disinfected   hand hygiene promoted   personal protective equipment utilized   rest/sleep promoted   visitors restricted/screened   single patient room provided  Taken 2/7/2022 0000 by Lizbet Thomas RN  Infection Prevention:   environmental surveillance performed   equipment surfaces disinfected   hand hygiene promoted   personal protective equipment utilized   rest/sleep promoted   single patient room provided   visitors restricted/screened  Taken 2/6/2022 2000 by Lizbet Thomas RN  Infection Prevention:   environmental surveillance performed   equipment surfaces disinfected   hand hygiene promoted   single patient room provided   visitors restricted/screened   personal protective equipment utilized   rest/sleep promoted  Goal: Optimal Comfort and Wellbeing  Outcome: Ongoing, Progressing  Intervention: Provide Person-Centered Care  Recent Flowsheet Documentation  Taken 2/6/2022 2000 by Lizbet Thomas RN  Trust Relationship/Rapport:   care explained   choices provided   emotional support provided   thoughts/feelings acknowledged   reassurance provided   questions encouraged  Goal: Readiness for Transition of Care  Outcome: Ongoing, Progressing     Problem: Skin Injury Risk  Increased  Goal: Skin Health and Integrity  Outcome: Ongoing, Progressing  Intervention: Optimize Skin Protection  Recent Flowsheet Documentation  Taken 2/7/2022 0400 by Lizbet Thomas RN  Pressure Reduction Techniques:   frequent weight shift encouraged   weight shift assistance provided   pressure points protected  Head of Bed (HOB): HOB lowered  Pressure Reduction Devices:   pressure-redistributing mattress utilized   heel offloading device utilized   positioning supports utilized  Skin Protection:   adhesive use limited   tubing/devices free from skin contact   skin-to-skin areas padded   skin-to-device areas padded   incontinence pads utilized  Taken 2/7/2022 0000 by Lizbet Thomas RN  Pressure Reduction Techniques:   frequent weight shift encouraged   weight shift assistance provided   pressure points protected  Head of Bed (HOB): HOB lowered  Pressure Reduction Devices:   pressure-redistributing mattress utilized   positioning supports utilized   heel offloading device utilized  Skin Protection:   adhesive use limited   tubing/devices free from skin contact   skin-to-skin areas padded   skin-to-device areas padded   incontinence pads utilized  Taken 2/6/2022 2000 by Lizbet Thomas RN  Pressure Reduction Techniques:   frequent weight shift encouraged   weight shift assistance provided   pressure points protected  Head of Bed (HOB): HOB at 20-30 degrees  Pressure Reduction Devices:   pressure-redistributing mattress utilized   positioning supports utilized   heel offloading device utilized  Skin Protection:   adhesive use limited   tubing/devices free from skin contact   skin-to-skin areas padded   skin-to-device areas padded   incontinence pads utilized   Goal Outcome Evaluation:

## 2022-02-07 NOTE — CASE MANAGEMENT/SOCIAL WORK
Discharge Planning Assessment  [facility]     Patient Name: Ila Galaviz  MRN: [id number]  Today's Date: 2/7/2022    Admit Date: 2/5/2022     Discharge Needs Assessment     Row Name 02/07/22 0913       Living Environment    Lives With spouse    Name(s) of Who Lives With Patient CLAIRE () [phone]    Current Living Arrangements home/apartment/condo    Primary Care Provided by self    Provides Primary Care For no one    Family Caregiver if Needed spouse    Family Caregiver Names CLAIRE Galaviz ()    Able to Return to Prior Arrangements yes       Resource/Environmental Concerns    Resource/Environmental Concerns none    Transportation Concerns car, none       Transition Planning    Patient/Family Anticipates Transition to home with help/services    Patient/Family Anticipated Services at Transition none    Transportation Anticipated family or friend will provide       Discharge Needs Assessment    Readmission Within the Last 30 Days no previous admission in last 30 days    Current Outpatient/Agency/Support Group homecare agency    Equipment Currently Used at Home rollator    Concerns to be Addressed denies needs/concerns at this time    Anticipated Changes Related to Illness none    Equipment Needed After Discharge none    Outpatient/Agency/Support Group Needs homecare agency    Discharge Facility/Level of Care Needs home with home health               Discharge Plan     Row Name 02/07/22 0914       Plan    Plan Home with home health    Patient/Family in Agreement with Plan yes    Plan Comments Spoke with patient at bedside. Lives with CLAIRE Galaviz () [phone] in [employer] Is independent with ADL's. No problems with [facility] Medicare or medications. Uses a rollator at home. Has advanced directives. Plan is home with Home Health. CM will continue to follow.    Final Discharge Disposition Code 06 - home with home health care              Continued Care and Services - Admitted Since 2/5/2022    Coordination  has not been started for this encounter.          Demographic Summary     Row Name 02/07/22 0912       General Information    Admission Type observation    Arrived From emergency department    Referral Source admission list    Reason for Consult discharge planning    Preferred Language English     Used During This Interaction no       Contact Information    Permission Granted to Share Info With     Contact Information Obtained for     Contact Information Comments PCP is FACUNDO Duran               Functional Status     Row Name 02/07/22 0913       Functional Status    Usual Activity Tolerance moderate    Current Activity Tolerance moderate       Functional Status, IADL    Medications independent    Meal Preparation independent    Housekeeping independent    Laundry independent    Shopping independent       Mental Status    General Appearance WDL WDL       Mental Status Summary    Recent Changes in Mental Status/Cognitive Functioning no changes       Employment/    Employment Status retired               Psychosocial    No documentation.                Abuse/Neglect    No documentation.                Legal    No documentation.                Substance Abuse    No documentation.                Patient Forms    No documentation.                   Hao Cortes RN

## 2022-02-07 NOTE — CASE MANAGEMENT/SOCIAL WORK
Case Management Discharge Note      Final Note: Plan is home with [facility] PT/OT. Family will transport. Jaylene rodriguez [facility] has accepted the patient. Patient denies any other discharge needs.         Selected Continued Care - Admitted Since 2/5/2022     Destination    No services have been selected for the patient.              Durable Medical Equipment    No services have been selected for the patient.              Dialysis/Infusion    No services have been selected for the patient.              Home Medical Care Coordination complete.    Service Provider Selected Services Address Phone Fax Patient Preferred    [facility]  Home Health Services [address] [address] [phone] --          Therapy    No services have been selected for the patient.              Community Resources    No services have been selected for the patient.              Community & DME    No services have been selected for the patient.                       Final Discharge Disposition Code: 06 - home with home health care

## 2022-02-07 NOTE — DISCHARGE SUMMARY
[facility] Medicine Services  DISCHARGE SUMMARY    Patient Name: Ial Galaviz  : 1935  MRN: [id number]    Date of Admission: 2022  2:30 PM  Date of Discharge:  2022  Primary Care Physician: Erin Rubio APRN    Consults     No orders found from 2022 to 2022.          Hospital Course     Presenting Problem:   Acute UTI [N39.0]    Active Hospital Problems    Diagnosis  POA   • **Acute UTI [N39.0]  Yes   • Leukocytosis [D72.829]  Yes   • Weakness [R53.1]  Yes   • CKD (chronic kidney disease) stage 3, GFR 30-59 ml/min (HCC) [N18.30]  Yes   • NICM (nonischemic cardiomyopathy) (HCC) [I42.8]  Yes   • HTN (hypertension) [I10]  Yes   • Hypothyroidism (acquired) [E03.9]  Yes      Resolved Hospital Problems   No resolved problems to display.          Hospital Course:  Ila Galaviz is a 86 y.o. female with NICM, SSS s/p pacemaker, HTN, CKD, hypothyroidism, peripheral neuropathy who presented to the ED with generalized weakness after discontinuing her lyrica.  Found to have UTI due to enterobacter.  She was treated with ceftriaxone, transitioned to PO cefdinir at discharge to complete course.  She was evaluated by PT and home health recommended.  Her creatinine was elevated but per review of old records, baseline is 1.85.     Discharge Follow Up Recommendations for outpatient labs/diagnostics:  Follow up with PCP within 1 week    Day of Discharge     HPI:   Complains of her restless legs bothering her at night.  At home she uses compression socks which help.    Review of Systems  Gen- No fevers, chills  MSK- As above      Vital Signs:   Temp:  [97.7 °F (36.5 °C)-98.4 °F (36.9 °C)] 98.4 °F (36.9 °C)  Heart Rate:  [74-94] 94  Resp:  [16-18] 18  BP: (141-154)/(74-99) 154/74      Physical Exam:  Constitutional: No acute distress, awake, alert, sitting up in chair  HENT: NCAT, mucous membranes moist  Respiratory: Clear to auscultation bilaterally, respiratory effort normal on  room air  Cardiovascular: RRR, no murmurs, rubs, or gallops  Gastrointestinal: Positive bowel sounds, soft, nontender, nondistended  Musculoskeletal: No bilateral ankle edema  Psychiatric: Appropriate affect, cooperative  Neurologic: Cranial Nerves grossly intact to confrontation, speech clear  Skin: No rashes on exposed surfaces    Pertinent  and/or Most Recent Results     LAB RESULTS:      Lab 02/07/22  0347 02/06/22  0736 02/05/22 1713 02/05/22  1500   WBC 10.83* 9.45  --  12.66*   HEMOGLOBIN 13.8 14.2  --  15.2   HEMATOCRIT 43.1 44.7  --  45.6   PLATELETS 241 261  --  293   NEUTROS ABS  --  6.16  --  8.59*   IMMATURE GRANS (ABS)  --  0.02  --  0.04   LYMPHS ABS  --  2.53  --  3.15*   MONOS ABS  --  0.62  --  0.82   EOS ABS  --  0.09  --  0.04   MCV 82.9 83.4  --  79.7   LACTATE  --   --  1.0  --          Lab 02/07/22  0347 02/06/22  0736 02/05/22  1500   SODIUM 138 139 138   POTASSIUM 3.7 3.9 4.2   CHLORIDE 106 107 104   CO2 20.0* 20.0* 22.0   ANION GAP 12.0 12.0 12.0   BUN 42* 39* 45*   CREATININE 1.64* 1.49* 1.44*   GLUCOSE 104* 96 108*   CALCIUM 8.4* 8.6 9.6   MAGNESIUM  --   --  2.1   TSH  --   --  3.580         Lab 02/05/22  1500   TOTAL PROTEIN 7.9   ALBUMIN 4.40   GLOBULIN 3.5   ALT (SGPT) 14   AST (SGOT) 14   BILIRUBIN 0.6   ALK PHOS 65                 Lab 02/07/22  0347   FERRITIN 112.60         Brief Urine Lab Results  (Last result in the past 365 days)      Color   Clarity   Blood   Leuk Est   Nitrite   Protein   CREAT   Urine HCG        02/05/22 1537 Yellow   Clear   Trace   Small (1+)   Positive   100 mg/dL (2+)               Microbiology Results (last 10 days)     Procedure Component Value - Date/Time    Blood Culture - Blood, Arm, Right [[id number]]  (Normal) Collected: 02/05/22 1713    Lab Status: Preliminary result Specimen: Blood from Arm, Right Updated: 02/06/22 1800     Blood Culture No growth at 24 hours    Blood Culture - Blood, Hand, Right [[id number]]  (Normal) Collected: 02/05/22 4402     Lab Status: Preliminary result Specimen: Blood from Hand, Right Updated: 02/06/22 1800     Blood Culture No growth at 24 hours    Urine Culture - Urine, Urine, Catheter In/Out [[id number]]  (Abnormal)  (Susceptibility) Collected: 02/05/22 1537    Lab Status: Final result Specimen: Urine, Catheter In/Out Updated: 02/07/22 0711     Urine Culture >100,000 CFU/mL Enterobacter cloacae complex    Susceptibility      Enterobacter cloacae complex     SUBHASH     Cefazolin Resistant     Cefepime Susceptible     Ceftazidime Susceptible     Ceftriaxone Susceptible     Gentamicin Susceptible     Levofloxacin Susceptible     Meropenem Susceptible     Nitrofurantoin Susceptible     Piperacillin + Tazobactam Susceptible     Trimethoprim + Sulfamethoxazole Susceptible                         COVID PRE-OP / PRE-PROCEDURE SCREENING ORDER (NO ISOLATION) - Swab, Nasopharynx [[id number]]  (Normal) Collected: 02/05/22 1500    Lab Status: Final result Specimen: Swab from Nasopharynx Updated: 02/05/22 1554    Narrative:      The following orders were created for panel order COVID PRE-OP / PRE-PROCEDURE SCREENING ORDER (NO ISOLATION) - Swab, Nasopharynx.  Procedure                               Abnormality         Status                     ---------                               -----------         ------                     COVID-19 and FLU A/B PCR...[[id number]]  Normal              Final result                 Please view results for these tests on the individual orders.    COVID-19 and FLU A/B PCR - Swab, Nasopharynx [[id number]]  (Normal) Collected: 02/05/22 1500    Lab Status: Final result Specimen: Swab from Nasopharynx Updated: 02/05/22 1554     COVID19 Not Detected     Influenza A PCR Not Detected     Influenza B PCR Not Detected    Narrative:      Fact sheet for providers: https://www.fda.gov/media/935401/download    Fact sheet for patients: https://www.fda.gov/media/014961/download    Test performed by PCR.          Adult Transthoracic  Echo Complete W/ Cont if Necessary Per Protocol    Result Date: 2/6/2022  · Left ventricular ejection fraction appears to be 51 - 55%. Left ventricular systolic function is normal. · Left ventricular wall thickness is consistent with mild concentric hypertrophy. · There is calcification of the aortic valve. · Mild mitral valve stenosis is present. · Estimated right ventricular systolic pressure from tricuspid regurgitation is normal (<35 mmHg). Calculated right ventricular systolic pressure from tricuspid regurgitation is 18 mmHg. · Left ventricular diastolic function is consistent with (grade Ia w/high LAP) impaired relaxation.      XR Chest 1 View    Result Date: 2/5/2022  EXAMINATION: XR CHEST 1 VW-  INDICATION: weak  COMPARISON: NONE  FINDINGS: Left chest wall ICD noted in place. Mild chronic changes of the lung fields are present without focal airspace opacity. There is no significant pleural effusion or distinct pneumothorax. Normal heart and mediastinal contours.      Mild chronic changes of the lung fields without evidence of acute cardiopulmonary abnormality.  This report was finalized on 2/5/2022 4:15 PM by Wild Rea.                Results for orders placed during the hospital encounter of 02/05/22    Adult Transthoracic Echo Complete W/ Cont if Necessary Per Protocol    Interpretation Summary  · Left ventricular ejection fraction appears to be 51 - 55%. Left ventricular systolic function is normal.  · Left ventricular wall thickness is consistent with mild concentric hypertrophy.  · There is calcification of the aortic valve.  · Mild mitral valve stenosis is present.  · Estimated right ventricular systolic pressure from tricuspid regurgitation is normal (<35 mmHg). Calculated right ventricular systolic pressure from tricuspid regurgitation is 18 mmHg.  · Left ventricular diastolic function is consistent with (grade Ia w/high LAP) impaired relaxation.      Plan for Follow-up of Pending  Labs/Results: Will notify patient if further results warrant a change in management   Pending Labs     Order Current Status    Blood Culture - Blood, Arm, Right Preliminary result    Blood Culture - Blood, Hand, Right Preliminary result        Discharge Details        Discharge Medications      New Medications      Instructions Start Date   cefdinir 300 MG capsule  Commonly known as: OMNICEF   300 mg, Oral, Daily   Start Date: February 8, 2022        Continue These Medications      Instructions Start Date   acetaminophen 500 MG tablet  Commonly known as: TYLENOL   500 mg, Oral, Every 6 Hours PRN      carvedilol 6.25 MG tablet  Commonly known as: COREG   6.25 mg, Oral, 2 Times Daily With Meals      IRBESARTAN PO   Oral      levothyroxine 112 MCG tablet  Commonly known as: SYNTHROID, LEVOTHROID   112 mcg, Oral, Daily      montelukast 10 MG tablet  Commonly known as: SINGULAIR   10 mg, Oral, Nightly      multivitamin with minerals tablet tablet   Oral      multivitamin with minerals tablet tablet   Oral      rOPINIRole 1 MG tablet  Commonly known as: REQUIP   1 mg, Oral, 2 Times Daily, Take 1 hour before bedtime.       saccharomyces boulardii 250 MG capsule  Commonly known as: FLORASTOR   250 mg, Oral, 2 Times Daily      traZODone 50 MG tablet  Commonly known as: DESYREL   100 mg, Oral, Nightly         Stop These Medications    methylPREDNISolone 4 MG dose pack  Commonly known as: MEDROL     PREGABALIN PO            Allergies   Allergen Reactions   • Sulfa Antibiotics Hives   • Erythromycin Base Rash   • Penicillins Rash   • Tetracycline Hcl Rash         Discharge Disposition:  Home-Health Care [facility]    Diet:  Hospital:  Diet Order   Procedures   • Diet Regular; Cardiac          CODE STATUS:    Code Status and Medical Interventions:   Ordered at: 02/05/22 6121     Level Of Support Discussed With:    Patient     Code Status (Patient has no pulse and is not breathing):    CPR (Attempt to Resuscitate)     Medical  Interventions (Patient has pulse or is breathing):    Full Support       No future appointments.    Additional Instructions for the Follow-ups that You Need to Schedule     Ambulatory Referral to Home Health   As directed      Face to Face Visit Date: 2/7/2022    Follow-up provider for Plan of Care?: I will be treating the patient on an ongoing basis.  Please send me the Plan of Care for signature.    Follow-up provider: MIKAELA RUBIO [[id number]]    Reason/Clinical Findings: UTI    Describe mobility limitations that make leaving home difficult: Impaired mobility    Nursing/Therapeutic Services Requested: Skilled Nursing Physical Therapy Occupational Therapy    Skilled nursing orders: Medication education Pain management Cardiopulmonary assessments    PT orders: Therapeutic exercise Gait Training Strengthening Home safety assessment    Weight Bearing Status: Full Weight Bearing    Occupational orders: Activities of daily living Energy conservation Strengthening Home safety assessment    Frequency: 1 Week 1         Discharge Follow-up with PCP   As directed       Currently Documented PCP:    Mikaela Rubio APRN    PCP Phone Number:    [phone]     Follow Up Details: 1 week                     Dianne aPte MD  02/07/22      Time Spent on Discharge:  I spent  34  minutes on this discharge activity which included: face-to-face encounter with the patient, reviewing the data in the system, coordination of the care with the nursing staff as well as consultants, documentation, and entering orders.

## 2022-02-07 NOTE — PROGRESS NOTES
Met with patient and she is agreeable to [facility]. Jaylene Barone RN, [facility] Emergency Department, Liaison

## 2022-02-09 ENCOUNTER — HOME CARE VISIT (OUTPATIENT)
Dept: HOME HEALTH SERVICES | Facility: HOME HEALTHCARE | Age: 87
End: 2022-02-09

## 2022-02-09 VITALS
RESPIRATION RATE: 17 BRPM | TEMPERATURE: 98.1 F | HEART RATE: 77 BPM | SYSTOLIC BLOOD PRESSURE: 137 MMHG | DIASTOLIC BLOOD PRESSURE: 78 MMHG | OXYGEN SATURATION: 97 %

## 2022-02-09 PROCEDURE — G0151 HHCP-SERV OF PT,EA 15 MIN: HCPCS

## 2022-02-10 ENCOUNTER — HOME CARE VISIT (OUTPATIENT)
Dept: HOME HEALTH SERVICES | Facility: HOME HEALTHCARE | Age: 87
End: 2022-02-10

## 2022-02-10 VITALS — DIASTOLIC BLOOD PRESSURE: 72 MMHG | SYSTOLIC BLOOD PRESSURE: 142 MMHG | OXYGEN SATURATION: 95 % | HEART RATE: 85 BPM

## 2022-02-10 LAB
BACTERIA SPEC AEROBE CULT: NORMAL
BACTERIA SPEC AEROBE CULT: NORMAL

## 2022-02-10 PROCEDURE — G0152 HHCP-SERV OF OT,EA 15 MIN: HCPCS

## 2022-02-10 NOTE — HOME HEALTH
"Patient is an 86 year old female s/p hospitalization for an acute UTI. PMH includes but is not limited to- NICM, SSS s/p pacemaker, HTN, CKD, hypothyroidism, peripheral neuropathy. Patient seen today for an OT evaluation since returning home. Patient reports her main issue is that her \"legs are so weak.\" She was able to demonstrate independence with BADL skills, and bathroom transfers today. She feels like she is back at her baseline with ADL's, so no further OT visits are needed at this time. Patient would just like PT to get her legs stronger. She has assistance from her spouse, and sister right now but she is returning home tomorrow. She also will have assistance from her daughter when she returns from [place]."

## 2022-02-11 ENCOUNTER — HOME CARE VISIT (OUTPATIENT)
Dept: HOME HEALTH SERVICES | Facility: HOME HEALTHCARE | Age: 87
End: 2022-02-11

## 2022-02-11 PROCEDURE — G0299 HHS/HOSPICE OF RN EA 15 MIN: HCPCS

## 2022-02-13 VITALS
TEMPERATURE: 97.7 F | DIASTOLIC BLOOD PRESSURE: 60 MMHG | RESPIRATION RATE: 16 BRPM | HEART RATE: 82 BPM | OXYGEN SATURATION: 95 % | SYSTOLIC BLOOD PRESSURE: 121 MMHG

## 2022-02-18 ENCOUNTER — HOME CARE VISIT (OUTPATIENT)
Dept: HOME HEALTH SERVICES | Facility: HOME HEALTHCARE | Age: 87
End: 2022-02-18

## 2022-02-18 VITALS
RESPIRATION RATE: 18 BRPM | DIASTOLIC BLOOD PRESSURE: 84 MMHG | SYSTOLIC BLOOD PRESSURE: 148 MMHG | OXYGEN SATURATION: 98 % | TEMPERATURE: 96.8 F | HEART RATE: 78 BPM

## 2022-02-18 PROCEDURE — G0151 HHCP-SERV OF PT,EA 15 MIN: HCPCS

## 2022-02-22 ENCOUNTER — HOME CARE VISIT (OUTPATIENT)
Dept: HOME HEALTH SERVICES | Facility: HOME HEALTHCARE | Age: 87
End: 2022-02-22

## 2022-02-22 VITALS
RESPIRATION RATE: 18 BRPM | TEMPERATURE: 98.1 F | OXYGEN SATURATION: 97 % | DIASTOLIC BLOOD PRESSURE: 84 MMHG | SYSTOLIC BLOOD PRESSURE: 154 MMHG | HEART RATE: 64 BPM

## 2022-02-22 PROCEDURE — G0151 HHCP-SERV OF PT,EA 15 MIN: HCPCS

## 2022-02-25 ENCOUNTER — HOME CARE VISIT (OUTPATIENT)
Dept: HOME HEALTH SERVICES | Facility: HOME HEALTHCARE | Age: 87
End: 2022-02-25

## 2022-02-25 VITALS
TEMPERATURE: 97.6 F | DIASTOLIC BLOOD PRESSURE: 70 MMHG | OXYGEN SATURATION: 96 % | SYSTOLIC BLOOD PRESSURE: 114 MMHG | RESPIRATION RATE: 18 BRPM | HEART RATE: 71 BPM

## 2022-02-25 PROCEDURE — G0151 HHCP-SERV OF PT,EA 15 MIN: HCPCS

## 2022-03-01 ENCOUNTER — HOME CARE VISIT (OUTPATIENT)
Dept: HOME HEALTH SERVICES | Facility: HOME HEALTHCARE | Age: 87
End: 2022-03-01

## 2022-03-01 VITALS
RESPIRATION RATE: 18 BRPM | DIASTOLIC BLOOD PRESSURE: 80 MMHG | SYSTOLIC BLOOD PRESSURE: 142 MMHG | HEART RATE: 80 BPM | OXYGEN SATURATION: 98 % | TEMPERATURE: 97.7 F

## 2022-03-01 PROCEDURE — G0151 HHCP-SERV OF PT,EA 15 MIN: HCPCS

## 2022-03-03 ENCOUNTER — HOME CARE VISIT (OUTPATIENT)
Dept: HOME HEALTH SERVICES | Facility: HOME HEALTHCARE | Age: 87
End: 2022-03-03

## 2022-03-03 VITALS
SYSTOLIC BLOOD PRESSURE: 158 MMHG | RESPIRATION RATE: 18 BRPM | TEMPERATURE: 97.7 F | HEART RATE: 100 BPM | OXYGEN SATURATION: 98 % | DIASTOLIC BLOOD PRESSURE: 86 MMHG

## 2022-03-03 PROCEDURE — G0151 HHCP-SERV OF PT,EA 15 MIN: HCPCS

## 2022-03-03 NOTE — HOME HEALTH
Overall, patient more anxious today--spouse had fall yesterday requiring ER visit; patient more restless and fatigues quickly today; continue to progress dynamic gait/balance training

## 2022-03-07 ENCOUNTER — HOME CARE VISIT (OUTPATIENT)
Dept: HOME HEALTH SERVICES | Facility: HOME HEALTHCARE | Age: 87
End: 2022-03-07

## 2022-03-07 VITALS
SYSTOLIC BLOOD PRESSURE: 160 MMHG | RESPIRATION RATE: 18 BRPM | DIASTOLIC BLOOD PRESSURE: 86 MMHG | HEART RATE: 80 BPM | TEMPERATURE: 98.2 F | OXYGEN SATURATION: 96 %

## 2022-03-07 PROCEDURE — G0151 HHCP-SERV OF PT,EA 15 MIN: HCPCS

## 2022-03-10 ENCOUNTER — HOME CARE VISIT (OUTPATIENT)
Dept: HOME HEALTH SERVICES | Facility: HOME HEALTHCARE | Age: 87
End: 2022-03-10

## 2022-03-10 VITALS
RESPIRATION RATE: 18 BRPM | HEART RATE: 76 BPM | DIASTOLIC BLOOD PRESSURE: 80 MMHG | OXYGEN SATURATION: 98 % | SYSTOLIC BLOOD PRESSURE: 130 MMHG | TEMPERATURE: 97.8 F

## 2022-03-10 PROCEDURE — G0151 HHCP-SERV OF PT,EA 15 MIN: HCPCS

## 2022-03-14 ENCOUNTER — HOME CARE VISIT (OUTPATIENT)
Dept: HOME HEALTH SERVICES | Facility: HOME HEALTHCARE | Age: 87
End: 2022-03-14

## 2022-03-14 VITALS
TEMPERATURE: 97.8 F | OXYGEN SATURATION: 98 % | DIASTOLIC BLOOD PRESSURE: 84 MMHG | SYSTOLIC BLOOD PRESSURE: 142 MMHG | RESPIRATION RATE: 18 BRPM | HEART RATE: 80 BPM

## 2022-03-14 PROCEDURE — G0151 HHCP-SERV OF PT,EA 15 MIN: HCPCS

## 2022-03-21 ENCOUNTER — HOME CARE VISIT (OUTPATIENT)
Dept: HOME HEALTH SERVICES | Facility: HOME HEALTHCARE | Age: 87
End: 2022-03-21

## 2022-03-21 VITALS
DIASTOLIC BLOOD PRESSURE: 84 MMHG | RESPIRATION RATE: 18 BRPM | HEART RATE: 72 BPM | OXYGEN SATURATION: 98 % | TEMPERATURE: 98 F | SYSTOLIC BLOOD PRESSURE: 142 MMHG

## 2022-03-21 PROCEDURE — G0151 HHCP-SERV OF PT,EA 15 MIN: HCPCS

## 2022-06-23 ENCOUNTER — HOSPITAL ENCOUNTER (EMERGENCY)
Facility: HOSPITAL | Age: 87
Discharge: HOME | End: 2022-06-23
Attending: EMERGENCY MEDICINE | Admitting: EMERGENCY MEDICINE

## 2022-06-23 ENCOUNTER — APPOINTMENT (OUTPATIENT)
Dept: GENERAL RADIOLOGY | Facility: HOSPITAL | Age: 87
End: 2022-06-23

## 2022-06-23 VITALS
OXYGEN SATURATION: 100 % | DIASTOLIC BLOOD PRESSURE: 86 MMHG | SYSTOLIC BLOOD PRESSURE: 157 MMHG | HEART RATE: 83 BPM | HEIGHT: 62 IN | BODY MASS INDEX: 24.66 KG/M2 | RESPIRATION RATE: 17 BRPM | TEMPERATURE: 98.4 F | WEIGHT: 134 LBS

## 2022-06-23 DIAGNOSIS — N39.0 URINARY TRACT INFECTION WITHOUT HEMATURIA, SITE UNSPECIFIED: ICD-10-CM

## 2022-06-23 DIAGNOSIS — R53.1 GENERALIZED WEAKNESS: Primary | ICD-10-CM

## 2022-06-23 LAB
ALBUMIN SERPL-MCNC: 4.3 G/DL (ref 3.5–5.2)
ALBUMIN/GLOB SERPL: 1.2 G/DL
ALP SERPL-CCNC: 81 U/L (ref 39–117)
ALT SERPL W P-5'-P-CCNC: 11 U/L (ref 1–33)
ANION GAP SERPL CALCULATED.3IONS-SCNC: 9 MMOL/L (ref 5–15)
AST SERPL-CCNC: 16 U/L (ref 1–32)
BACTERIA UR QL AUTO: ABNORMAL /HPF
BASOPHILS # BLD AUTO: 0.03 10*3/MM3 (ref 0–0.2)
BASOPHILS NFR BLD AUTO: 0.3 % (ref 0–1.5)
BILIRUB SERPL-MCNC: 0.6 MG/DL (ref 0–1.2)
BILIRUB UR QL STRIP: NEGATIVE
BUN SERPL-MCNC: 33 MG/DL (ref 8–23)
BUN/CREAT SERPL: 22.6 (ref 7–25)
CALCIUM SPEC-SCNC: 9.2 MG/DL (ref 8.6–10.5)
CHLORIDE SERPL-SCNC: 102 MMOL/L (ref 98–107)
CLARITY UR: CLEAR
CO2 SERPL-SCNC: 27 MMOL/L (ref 22–29)
COLOR UR: YELLOW
CREAT SERPL-MCNC: 1.46 MG/DL (ref 0.57–1)
DEPRECATED RDW RBC AUTO: 48.9 FL (ref 37–54)
EGFRCR SERPLBLD CKD-EPI 2021: 35.3 ML/MIN/1.73
EOSINOPHIL # BLD AUTO: 0.12 10*3/MM3 (ref 0–0.4)
EOSINOPHIL NFR BLD AUTO: 1.3 % (ref 0.3–6.2)
ERYTHROCYTE [DISTWIDTH] IN BLOOD BY AUTOMATED COUNT: 15.6 % (ref 12.3–15.4)
GLOBULIN UR ELPH-MCNC: 3.6 GM/DL
GLUCOSE SERPL-MCNC: 111 MG/DL (ref 65–99)
GLUCOSE UR STRIP-MCNC: NEGATIVE MG/DL
HCT VFR BLD AUTO: 43.8 % (ref 34–46.6)
HGB BLD-MCNC: 14.2 G/DL (ref 12–15.9)
HGB UR QL STRIP.AUTO: NEGATIVE
HOLD SPECIMEN: NORMAL
HYALINE CASTS UR QL AUTO: ABNORMAL /LPF
IMM GRANULOCYTES # BLD AUTO: 0.03 10*3/MM3 (ref 0–0.05)
IMM GRANULOCYTES NFR BLD AUTO: 0.3 % (ref 0–0.5)
KETONES UR QL STRIP: NEGATIVE
LEUKOCYTE ESTERASE UR QL STRIP.AUTO: ABNORMAL
LYMPHOCYTES # BLD AUTO: 2.3 10*3/MM3 (ref 0.7–3.1)
LYMPHOCYTES NFR BLD AUTO: 25.5 % (ref 19.6–45.3)
MAGNESIUM SERPL-MCNC: 2.1 MG/DL (ref 1.6–2.4)
MCH RBC QN AUTO: 27.7 PG (ref 26.6–33)
MCHC RBC AUTO-ENTMCNC: 32.4 G/DL (ref 31.5–35.7)
MCV RBC AUTO: 85.4 FL (ref 79–97)
MONOCYTES # BLD AUTO: 0.47 10*3/MM3 (ref 0.1–0.9)
MONOCYTES NFR BLD AUTO: 5.2 % (ref 5–12)
NEUTROPHILS NFR BLD AUTO: 6.08 10*3/MM3 (ref 1.7–7)
NEUTROPHILS NFR BLD AUTO: 67.4 % (ref 42.7–76)
NITRITE UR QL STRIP: NEGATIVE
NRBC BLD AUTO-RTO: 0 /100 WBC (ref 0–0.2)
PH UR STRIP.AUTO: 6 [PH] (ref 5–8)
PLATELET # BLD AUTO: 276 10*3/MM3 (ref 140–450)
PMV BLD AUTO: 9.1 FL (ref 6–12)
POTASSIUM SERPL-SCNC: 4.5 MMOL/L (ref 3.5–5.2)
PROT SERPL-MCNC: 7.9 G/DL (ref 6–8.5)
PROT UR QL STRIP: ABNORMAL
QT INTERVAL: 460 MS
QTC INTERVAL: 503 MS
RBC # BLD AUTO: 5.13 10*6/MM3 (ref 3.77–5.28)
RBC # UR STRIP: ABNORMAL /HPF
REF LAB TEST METHOD: ABNORMAL
SODIUM SERPL-SCNC: 138 MMOL/L (ref 136–145)
SP GR UR STRIP: 1.02 (ref 1–1.03)
SQUAMOUS #/AREA URNS HPF: ABNORMAL /HPF
TROPONIN T SERPL-MCNC: <0.01 NG/ML (ref 0–0.03)
UROBILINOGEN UR QL STRIP: ABNORMAL
WBC # UR STRIP: ABNORMAL /HPF
WBC NRBC COR # BLD: 9.03 10*3/MM3 (ref 3.4–10.8)
WHOLE BLOOD HOLD COAG: NORMAL
WHOLE BLOOD HOLD SPECIMEN: NORMAL

## 2022-06-23 PROCEDURE — 85025 COMPLETE CBC W/AUTO DIFF WBC: CPT | Performed by: EMERGENCY MEDICINE

## 2022-06-23 PROCEDURE — 99283 EMERGENCY DEPT VISIT LOW MDM: CPT

## 2022-06-23 PROCEDURE — 84484 ASSAY OF TROPONIN QUANT: CPT | Performed by: EMERGENCY MEDICINE

## 2022-06-23 PROCEDURE — 93005 ELECTROCARDIOGRAM TRACING: CPT | Performed by: EMERGENCY MEDICINE

## 2022-06-23 PROCEDURE — 83735 ASSAY OF MAGNESIUM: CPT | Performed by: EMERGENCY MEDICINE

## 2022-06-23 PROCEDURE — 81001 URINALYSIS AUTO W/SCOPE: CPT | Performed by: EMERGENCY MEDICINE

## 2022-06-23 PROCEDURE — 71045 X-RAY EXAM CHEST 1 VIEW: CPT

## 2022-06-23 PROCEDURE — 96365 THER/PROPH/DIAG IV INF INIT: CPT

## 2022-06-23 PROCEDURE — 25010000002 CEFTRIAXONE PER 250 MG: Performed by: EMERGENCY MEDICINE

## 2022-06-23 PROCEDURE — 80053 COMPREHEN METABOLIC PANEL: CPT | Performed by: EMERGENCY MEDICINE

## 2022-06-23 RX ORDER — SODIUM CHLORIDE 0.9 % (FLUSH) 0.9 %
10 SYRINGE (ML) INJECTION AS NEEDED
Status: DISCONTINUED | OUTPATIENT
Start: 2022-06-23 | End: 2022-06-23 | Stop reason: HOSPADM

## 2022-06-23 RX ORDER — PRAMIPEXOLE DIHYDROCHLORIDE 0.5 MG/1
1 TABLET ORAL DAILY
COMMUNITY

## 2022-06-23 RX ORDER — CEFDINIR 300 MG/1
300 CAPSULE ORAL 2 TIMES DAILY
Qty: 14 CAPSULE | Refills: 0 | Status: SHIPPED | OUTPATIENT
Start: 2022-06-23 | End: 2022-06-30

## 2022-06-23 RX ORDER — ONDANSETRON 4 MG/1
4 TABLET, ORALLY DISINTEGRATING ORAL 4 TIMES DAILY PRN
Qty: 15 TABLET | Refills: 0 | Status: SHIPPED | OUTPATIENT
Start: 2022-06-23

## 2022-06-23 RX ORDER — FUROSEMIDE 20 MG/1
20 TABLET ORAL DAILY
COMMUNITY
End: 2022-10-03 | Stop reason: HOSPADM

## 2022-06-23 RX ADMIN — SODIUM CHLORIDE 1 G: 9 INJECTION, SOLUTION INTRAVENOUS at 12:20

## 2022-06-23 RX ADMIN — SODIUM CHLORIDE 500 ML: 9 INJECTION, SOLUTION INTRAVENOUS at 12:24

## 2022-06-23 NOTE — ED PROVIDER NOTES
[facility]    EMERGENCY DEPARTMENT ENCOUNTER      Pt Name: Ila Galaviz  MRN: [id number]  YOB: 1935  Date of evaluation: 6/23/2022  Provider: Esa Eid DO    CHIEF COMPLAINT       Chief Complaint   Patient presents with   • Weakness - Generalized         HISTORY OF PRESENT ILLNESS  (Location/Symptom, Timing/Onset, Context/Setting, Quality, Duration, Modifying Factors, Severity.)   Ila Galaviz is a 86 y.o. female who presents to the emergency department for evaluation generalized weakness which been present for last 1 to 2 weeks.  History is provided by both the patient and her 5 member at the bedside.  They note the patient has had increased urinary frequency, history of recurrent urinary tract infections.  She did finish up a round of antibiotics last Saturday, continues with weakness diffusely throughout, no unilateral weakness but notes her legs are discussed weaker than general cannot really get up and get around using her walker she usually can at baseline.  She denies any fever, chills, chest pain, cough congestion, denies abdominal pain, does admit to increased urinary frequency, nonbloody in nature.  She denies any other acute systemic complaints at this time.  Has been try to stay fluid hydrated but states she has been losing more fluid than she has been putting in.  Has a history of chronic renal insufficiency.      Nursing notes were reviewed.    REVIEW OF SYSTEMS    (2-9 systems for level 4, 10 or more for level 5)   ROS:  General:  No fevers, no chills, + generalized weakness  Cardiovascular:  No chest pain, no palpitations  Respiratory:  No shortness of breath, no cough, no wheezing  Gastrointestinal:  No pain, no nausea, no vomiting, no diarrhea  Musculoskeletal:  No muscle pain, no joint pain  Skin:  No rash  Neurologic:  No speech problems, no headache, no extremity numbness, no extremity tingling, no extremity weakness  Psychiatric:  No anxiety  Genitourinary:, Positive  polyuria no dysuria, no hematuria    Except as noted above the remainder of the review of systems was reviewed and negative.       PAST MEDICAL HISTORY     Past Medical History:   Diagnosis Date   • CHF (congestive heart failure) (HCC)    • Disease of thyroid gland    • Hypertension    • Kidney disease          SURGICAL HISTORY       Past Surgical History:   Procedure Laterality Date   • THYROID SURGERY           CURRENT MEDICATIONS       Current Facility-Administered Medications:   •  sodium chloride 0.9 % flush 10 mL, 10 mL, Intravenous, PRN, Esa Eid,     Current Outpatient Medications:   •  acetaminophen (TYLENOL) 500 MG tablet, Take 500 mg by mouth Every 6 (Six) Hours As Needed for Mild Pain ., Disp: , Rfl:   •  carvedilol (COREG) 6.25 MG tablet, Take 6.25 mg by mouth 2 (Two) Times a Day With Meals., Disp: , Rfl:   •  cefdinir (OMNICEF) 300 MG capsule, Take 1 capsule by mouth 2 (Two) Times a Day for 7 days., Disp: 14 capsule, Rfl: 0  •  furosemide (LASIX) 20 MG tablet, Take 20 mg by mouth Daily. 1 every other day, Disp: , Rfl:   •  IRBESARTAN PO, Take  by mouth., Disp: , Rfl:   •  levothyroxine (SYNTHROID, LEVOTHROID) 112 MCG tablet, Take 112 mcg by mouth Daily., Disp: , Rfl:   •  montelukast (SINGULAIR) 10 MG tablet, Take 10 mg by mouth Every Night., Disp: , Rfl:   •  Multiple Vitamins-Minerals (CENTRUM SILVER PO), Take  by mouth., Disp: , Rfl:   •  Multiple Vitamins-Minerals (PRESERVISION AREDS PO), Take  by mouth., Disp: , Rfl:   •  ondansetron ODT (ZOFRAN-ODT) 4 MG disintegrating tablet, Place 1 tablet on the tongue 4 (Four) Times a Day As Needed for Nausea or Vomiting., Disp: 15 tablet, Rfl: 0  •  pramipexole (MIRAPEX) 0.5 MG tablet, Take 1 mg by mouth Daily., Disp: , Rfl:   •  rOPINIRole (REQUIP) 1 MG tablet, Take 1 mg by mouth 2 (Two) Times a Day. Take 1 hour before bedtime., Disp: , Rfl:   •  saccharomyces boulardii (FLORASTOR) 250 MG capsule, Take 250 mg by mouth 2 (Two) Times a Day.,  "Disp: , Rfl:   •  traZODone (DESYREL) 50 MG tablet, Take 100 mg by mouth Every Night., Disp: , Rfl:     ALLERGIES     Sulfa antibiotics, Erythromycin base, Penicillins, and Tetracycline hcl    FAMILY HISTORY     History reviewed. No pertinent family history.       SOCIAL HISTORY       Social History     Socioeconomic History   • Marital status:    Tobacco Use   • Smoking status: Never Smoker   • Smokeless tobacco: Never Used   Substance and Sexual Activity   • Alcohol use: Never   • Drug use: Never   • Sexual activity: Defer         PHYSICAL EXAM    (up to 7 for level 4, 8 or more for level 5)     Vitals:    06/23/22 0945   BP: 157/86   BP Location: Left arm   Patient Position: Sitting   Pulse: 83   Resp: 17   Temp: 98.4 °F (36.9 °C)   TempSrc: Oral   SpO2: 100%   Weight: 60.8 kg (134 lb)   Height: 157.5 cm (62\")       Physical Exam  General : Patient is awake, alert, oriented, in no acute distress, nontoxic appearing  HEENT: Pupils are equally round and reactive to light, EOMI, conjunctivae clear, sclerae white  Neck: Neck is supple, full range of motion, trachea midline  Cardiac: Heart regular rate, rhythm, no murmurs, rubs, or gallops  Lungs: Lungs are clear to auscultation, there is no wheezing, rhonchi, or rales. There is no use of accessory muscles  Chest wall: There is no tenderness to palpation over the chest wall or over ribs  Abdomen: Abdomen is soft, nontender, nondistended. There are no firm or pulsatile masses, no rebound rigidity or guarding.   Musculoskeletal: 4 out of 5 strength in all 4 extremities.  No focal muscle deficits are appreciated  Neuro: Motor intact, sensory intact, level of consciousness is normal, cerebellar function is normal, no unilateral weakness, no focal neurological deficit on examination.  Dermatology: Skin is warm and dry  Psych: Mentation is grossly normal, cognition is grossly normal. Affect is appropriate.      DIAGNOSTIC RESULTS     EKG: All EKGs are interpreted by " the Emergency Department Physician who either signs or Co-signs this chart in the absence of a cardiologist.    ECG 12 Lead   Final Result   Test Reason : Weak/Dizzy/AMS protocol   Blood Pressure :   */*   mmHG   Vent. Rate :  72 BPM     Atrial Rate :  72 BPM      P-R Int : 132 ms          QRS Dur : 152 ms       QT Int : 460 ms       P-R-T Axes :  33 -87  75 degrees      QTc Int : 503 ms      Atrial-sensed ventricular-paced rhythm   Biventricular pacemaker detected   Abnormal ECG   When compared with ECG of 05-FEB-2022 15:31,   Vent. rate has decreased BY  10 BPM   Confirmed by STEVEN LEIVA MD ([id number]) on 6/23/2022 10:26:47 AM      Referred By: EDMD           Confirmed By: STEVEN LEIVA MD          RADIOLOGY:   Non-plain film images such as CT, Ultrasound and MRI are read by the radiologist. Plain radiographic images are visualized and preliminarily interpreted by the emergency physician with the below findings:      [] Radiologist's Report Reviewed:  XR Chest 1 View   Final Result       1. No acute cardiopulmonary disease.           This report was finalized on 6/23/2022 11:01 AM by Manas Duran MD.                ED BEDSIDE ULTRASOUND:   Performed by ED Physician - none    LABS:    I have reviewed and interpreted all of the currently available lab results from this visit (if applicable):  Results for orders placed or performed during the hospital encounter of 06/23/22   Comprehensive Metabolic Panel    Specimen: Blood   Result Value Ref Range    Glucose 111 (H) 65 - 99 mg/dL    BUN 33 (H) 8 - 23 mg/dL    Creatinine 1.46 (H) 0.57 - 1.00 mg/dL    Sodium 138 136 - 145 mmol/L    Potassium 4.5 3.5 - 5.2 mmol/L    Chloride 102 98 - 107 mmol/L    CO2 27.0 22.0 - 29.0 mmol/L    Calcium 9.2 8.6 - 10.5 mg/dL    Total Protein 7.9 6.0 - 8.5 g/dL    Albumin 4.30 3.50 - 5.20 g/dL    ALT (SGPT) 11 1 - 33 U/L    AST (SGOT) 16 1 - 32 U/L    Alkaline Phosphatase 81 39 - 117 U/L    Total Bilirubin 0.6 0.0 - 1.2 mg/dL    Globulin  3.6 gm/dL    A/G Ratio 1.2 g/dL    BUN/Creatinine Ratio 22.6 7.0 - 25.0    Anion Gap 9.0 5.0 - 15.0 mmol/L    eGFR 35.3 (L) >60.0 mL/min/1.73   Troponin    Specimen: Blood   Result Value Ref Range    Troponin T <0.010 0.000 - 0.030 ng/mL   Magnesium    Specimen: Blood   Result Value Ref Range    Magnesium 2.1 1.6 - 2.4 mg/dL   Urinalysis With Microscopic If Indicated (No Culture) - Urine, Clean Catch    Specimen: Urine, Clean Catch   Result Value Ref Range    Color, UA Yellow Yellow, Straw    Appearance, UA Clear Clear    pH, UA 6.0 5.0 - 8.0    Specific Gravity, UA 1.016 1.001 - 1.030    Glucose, UA Negative Negative    Ketones, UA Negative Negative    Bilirubin, UA Negative Negative    Blood, UA Negative Negative    Protein, UA Trace (A) Negative    Leuk Esterase, UA Small (1+) (A) Negative    Nitrite, UA Negative Negative    Urobilinogen, UA 0.2 E.U./dL 0.2 - 1.0 E.U./dL   CBC Auto Differential    Specimen: Blood   Result Value Ref Range    WBC 9.03 3.40 - 10.80 10*3/mm3    RBC 5.13 3.77 - 5.28 10*6/mm3    Hemoglobin 14.2 12.0 - 15.9 g/dL    Hematocrit 43.8 34.0 - 46.6 %    MCV 85.4 79.0 - 97.0 fL    MCH 27.7 26.6 - 33.0 pg    MCHC 32.4 31.5 - 35.7 g/dL    RDW 15.6 (H) 12.3 - 15.4 %    RDW-SD 48.9 37.0 - 54.0 fl    MPV 9.1 6.0 - 12.0 fL    Platelets 276 140 - 450 10*3/mm3    Neutrophil % 67.4 42.7 - 76.0 %    Lymphocyte % 25.5 19.6 - 45.3 %    Monocyte % 5.2 5.0 - 12.0 %    Eosinophil % 1.3 0.3 - 6.2 %    Basophil % 0.3 0.0 - 1.5 %    Immature Grans % 0.3 0.0 - 0.5 %    Neutrophils, Absolute 6.08 1.70 - 7.00 10*3/mm3    Lymphocytes, Absolute 2.30 0.70 - 3.10 10*3/mm3    Monocytes, Absolute 0.47 0.10 - 0.90 10*3/mm3    Eosinophils, Absolute 0.12 0.00 - 0.40 10*3/mm3    Basophils, Absolute 0.03 0.00 - 0.20 10*3/mm3    Immature Grans, Absolute 0.03 0.00 - 0.05 10*3/mm3    nRBC 0.0 0.0 - 0.2 /100 WBC   Urinalysis, Microscopic Only - Urine, Clean Catch    Specimen: Urine, Clean Catch   Result Value Ref Range    RBC,  "UA 0-2 None Seen, 0-2 /HPF    WBC, UA 3-5 (A) None Seen, 0-2 /HPF    Bacteria, UA None Seen None Seen, Trace /HPF    Squamous Epithelial Cells, UA 0-2 None Seen, 0-2 /HPF    Hyaline Casts, UA None Seen 0 - 6 /LPF    Methodology Automated Microscopy    ECG 12 Lead   Result Value Ref Range    QT Interval 460 ms    QTC Interval 503 ms   Green Top (Gel)   Result Value Ref Range    Extra Tube Hold for add-ons.    Lavender Top   Result Value Ref Range    Extra Tube hold for add-on    Gold Top - SST   Result Value Ref Range    Extra Tube Hold for add-ons.    Light Blue Top   Result Value Ref Range    Extra Tube Hold for add-ons.         All other labs were within normal range or not returned as of this dictation.      EMERGENCY DEPARTMENT COURSE and DIFFERENTIAL DIAGNOSIS/MDM:   Vitals:    Vitals:    06/23/22 0945   BP: 157/86   BP Location: Left arm   Patient Position: Sitting   Pulse: 83   Resp: 17   Temp: 98.4 °F (36.9 °C)   TempSrc: Oral   SpO2: 100%   Weight: 60.8 kg (134 lb)   Height: 157.5 cm (62\")            Patient with generalized weakness over the last couple weeks, history of recurrent urinary tract infections.  Continues to have generalized weakness, difficulty with getting around using her walker.  On evaluation her vital signs are stable, she is nontoxic-appearing, history of urinary complaints we did obtain IV, labs, urinalysis for further evaluation.  Results reviewed as above.  Patient with stable renal insufficiency chronic renal function baseline about 1.5.  No BC is normal, patient is small leukocytes, 3-5 WBCs, is having some urinary symptoms, we will proceed forward with treatment for recurrent urinary tract infection.  Maintain good fluid hydration.  She is planning on following up with physiotherapy secondary to recurrent deconditioning especially of her lower legs which she gets usually after urinary tract infections.  They feel comfortable doing this as an outpatient at home, have already " established the work-up and therapy with her PCP.  We will treat with antibiotics in the meantime, give her urology for referral with her recurrent UTIs return precaution discussed with the patient and her family were at bedside.    I had a discussion with the patient/family regarding diagnosis, diagnostic results, treatment plan, and medications.  The patient/family indicated understanding of these instructions.  I spent adequate time at the bedside preceding discharge necessary to personally discuss the aftercare instructions, giving patient education, providing explanations of the results of our evaluations/findings, and my decision making to assure that the patient/family understand the plan of care.  Time was allotted to answer questions at that time and throughout the ED course.  Emphasis was placed on timely follow-up after discharge.  I also discussed the potential for the development of an acute emergent condition requiring further evaluation, admission, or even surgical intervention. I discussed that we found nothing during the visit today indicating the need for further workup, admission, or the presence of an unstable medical condition.  I encouraged the patient to return to the emergency department immediately for ANY concerns, worsening, new complaints, or if symptoms persist and unable to seek follow-up in a timely fashion.  The patient/family expressed understanding and agreement with this plan.  The patient will follow-up with their PCP in 1-2 days for reevaluation.       MEDICATIONS ADMINISTERED IN ED:  Medications   sodium chloride 0.9 % flush 10 mL (has no administration in time range)   cefTRIAXone (ROCEPHIN) 1 g/100 mL 0.9% NS (MBP) (1 g Intravenous New Bag 6/23/22 1220)   sodium chloride 0.9 % bolus 500 mL (500 mL Intravenous New Bag 6/23/22 1224)       PROCEDURES:  Procedures    CRITICAL CARE TIME    Total Critical Care time was 0 minutes, excluding separately reportable procedures.   There  was a high probability of clinically significant/life threatening deterioration in the patient's condition which required my urgent intervention.      FINAL IMPRESSION      1. Generalized weakness    2. Urinary tract infection without hematuria, site unspecified          DISPOSITION/PLAN     ED Disposition     ED Disposition   Discharge    Condition   Stable    Comment   --             PATIENT REFERRED TO:  Jonnie Healy MD  [address]  [address]  [place] [address]  [phone]    Schedule an appointment as soon as possible for a visit   Urologist    Erin Rubio, APRN  [address] [place] [address]  [phone]    In 2 days      [facility] Emergency Department  [address] [place] [id number] [phone]    If symptoms worsen      DISCHARGE MEDICATIONS:     Medication List      START taking these medications    cefdinir 300 MG capsule  Commonly known as: OMNICEF  Take 1 capsule by mouth 2 (Two) Times a Day for 7 days.     ondansetron ODT 4 MG disintegrating tablet  Commonly known as: ZOFRAN-ODT  Place 1 tablet on the tongue 4 (Four) Times a Day As Needed for Nausea or Vomiting.        CONTINUE taking these medications    acetaminophen 500 MG tablet  Commonly known as: TYLENOL     carvedilol 6.25 MG tablet  Commonly known as: COREG     furosemide 20 MG tablet  Commonly known as: LASIX     IRBESARTAN PO     levothyroxine 112 MCG tablet  Commonly known as: SYNTHROID, LEVOTHROID     montelukast 10 MG tablet  Commonly known as: SINGULAIR     * multivitamin with minerals tablet tablet     * multivitamin with minerals tablet tablet     pramipexole 0.5 MG tablet  Commonly known as: MIRAPEX     rOPINIRole 1 MG tablet  Commonly known as: REQUIP     saccharomyces boulardii 250 MG capsule  Commonly known as: FLORASTOR     traZODone 50 MG tablet  Commonly known as: DESYREL         * This list has 2 medication(s) that are the same as other medications prescribed for  you. Read the directions carefully, and ask your doctor or other care provider to review them with you.               Where to Get Your Medications      These medications were sent to [id number] - [place], [place] - [address] AT [facility] [facility] [address] - [phone]  - [phone] [address] [address]    Phone: [phone]   · cefdinir 300 MG capsule  · ondansetron ODT 4 MG disintegrating tablet             Comment: Please note this report has been produced using speech recognition software.      Esa Eid DO  Attending Emergency Physician               Esa Eid,   06/23/22 7798

## 2022-06-23 NOTE — DISCHARGE INSTRUCTIONS
Please follow-up with your PCP in a couple days for reevaluation, also with urologist given your history of recurrent urinary tract infections.  Follow-up for referral and your physiotherapy for continued strengthening and conditioning.  Return to the ED with any worsening symptoms or any further concerns in the meantime.

## 2022-07-20 PROCEDURE — 87086 URINE CULTURE/COLONY COUNT: CPT | Performed by: NURSE PRACTITIONER

## 2022-07-22 ENCOUNTER — TELEPHONE (OUTPATIENT)
Dept: URGENT CARE | Facility: CLINIC | Age: 87
End: 2022-07-22

## 2022-08-08 ENCOUNTER — OFFICE VISIT (OUTPATIENT)
Dept: UROLOGY | Facility: CLINIC | Age: 87
End: 2022-08-08

## 2022-08-08 VITALS — HEIGHT: 63 IN | BODY MASS INDEX: 24.45 KG/M2 | WEIGHT: 138 LBS

## 2022-08-08 DIAGNOSIS — R39.9 LOWER URINARY TRACT SYMPTOMS (LUTS): Primary | ICD-10-CM

## 2022-08-08 DIAGNOSIS — N39.0 RECURRENT UTI: ICD-10-CM

## 2022-08-08 PROCEDURE — 99204 OFFICE O/P NEW MOD 45 MIN: CPT | Performed by: UROLOGY

## 2022-08-08 PROCEDURE — 51798 US URINE CAPACITY MEASURE: CPT | Performed by: UROLOGY

## 2022-08-08 RX ORDER — IPRATROPIUM BROMIDE 21 UG/1
SPRAY, METERED NASAL EVERY 12 HOURS SCHEDULED
COMMUNITY

## 2022-08-08 RX ORDER — ERGOCALCIFEROL 1.25 MG/1
CAPSULE ORAL
COMMUNITY
Start: 2022-08-03

## 2022-08-08 NOTE — PROGRESS NOTES
LUTS Female Office Visit      Patient Name: Ila Galaviz  : 1935   MRN: [id number]     Chief Complaint:  Lower Urinary Tract Symptoms.     Referring Provider: Esa Eid, *    History of Present Illness: Mrs. Galaviz is a 86 y.o. female with history of lower urinary tract symptoms.  Past medical history includes hypertension, nonischemic cardiomyopathy, hypothyroidism, CKD.. She presents today for evaluation of lower urinary tract symptoms and urinary tract infection.  In review of the patient's urine cultures she has had single positive urine culture in 2022 dating back to .  She reports that she does not have symptoms consistent with infection when she is told that she has a UTI.  She denies dysuria, frequency or urgency.  She reports that she follows with primary care and nephrology for CKD and that she often has urinalysis obtained at these evaluations.  She denies hematuria.  She does report mild baseline lower urinary tract symptoms.  She denies obstructive based symptoms including intermittency hesitancy or weakened stream.  PVR today appropriate low at 49 mL.  She denies incontinence.  She denies hematuria.    She does report decreased water intake, irritative fluid intake.  She reports history of constipation.  She denies past urologic valuation including instrumentation or procedure.      Subjective      Review of System: Review of Systems   Constitutional: Negative for chills, fatigue, fever and unexpected weight change.   HENT: Negative for sore throat.    Eyes: Negative for visual disturbance.   Respiratory: Negative for cough, chest tightness and shortness of breath.    Cardiovascular: Negative for chest pain and leg swelling.   Gastrointestinal: Negative for blood in stool, constipation, diarrhea, nausea, rectal pain and vomiting.   Genitourinary: Negative for decreased urine volume, difficulty urinating, dysuria, enuresis, flank pain, frequency, genital sores, hematuria  and urgency.   Musculoskeletal: Negative for back pain and joint swelling.   Skin: Negative for rash and wound.   Neurological: Negative for seizures, speech difficulty, weakness and headaches.   Psychiatric/Behavioral: Negative for confusion, sleep disturbance and suicidal ideas. The patient is not nervous/anxious.       I have reviewed the ROS documented by my clinical staff, updated appropriate and I agree. Jonnie Healy MD    Past Medical History:  Past Medical History:   Diagnosis Date   • CHF (congestive heart failure) (HCC)    • Disease of thyroid gland    • Hypertension    • Kidney disease    • Kidney stone        Past Surgical History:  Past Surgical History:   Procedure Laterality Date   • KIDNEY STONE SURGERY     • THYROID SURGERY         Medications:    Current Outpatient Medications:   •  acetaminophen (TYLENOL) 500 MG tablet, Take 500 mg by mouth Every 6 (Six) Hours As Needed for Mild Pain ., Disp: , Rfl:   •  carvedilol (COREG) 6.25 MG tablet, Take 6.25 mg by mouth 2 (Two) Times a Day With Meals., Disp: , Rfl:   •  denosumab (Prolia) 60 MG/ML solution prefilled syringe syringe, 60 mL., Disp: , Rfl:   •  furosemide (LASIX) 20 MG tablet, Take 20 mg by mouth Daily. 1 every other day, Disp: , Rfl:   •  ipratropium (ATROVENT) 0.03 % nasal spray, Every 12 (Twelve) Hours., Disp: , Rfl:   •  IRBESARTAN PO, Take  by mouth., Disp: , Rfl:   •  levothyroxine (SYNTHROID, LEVOTHROID) 112 MCG tablet, Take 112 mcg by mouth Daily., Disp: , Rfl:   •  montelukast (SINGULAIR) 10 MG tablet, Take 10 mg by mouth Every Night., Disp: , Rfl:   •  Multiple Vitamins-Minerals (CENTRUM SILVER PO), Take  by mouth., Disp: , Rfl:   •  Multiple Vitamins-Minerals (PRESERVISION AREDS PO), Take  by mouth., Disp: , Rfl:   •  multivitamin with minerals tablet tablet, ICaps AREDS2  Take one daily, Disp: , Rfl:   •  pramipexole (MIRAPEX) 0.5 MG tablet, Take 1 mg by mouth Daily., Disp: , Rfl:   •  rOPINIRole (REQUIP) 1 MG tablet, Take 1 mg  "by mouth 2 (Two) Times a Day. Take 1 hour before bedtime., Disp: , Rfl:   •  saccharomyces boulardii (FLORASTOR) 250 MG capsule, Take 250 mg by mouth 2 (Two) Times a Day., Disp: , Rfl:   •  traZODone (DESYREL) 50 MG tablet, Take 100 mg by mouth Every Night., Disp: , Rfl:   •  vitamin D (ERGOCALCIFEROL) 1.25 MG (17441 UT) capsule capsule, TAKE 1 CAPSULE BY MOUTH EVERY WEEK IN THE MORNING, Disp: , Rfl:   •  ondansetron ODT (ZOFRAN-ODT) 4 MG disintegrating tablet, Place 1 tablet on the tongue 4 (Four) Times a Day As Needed for Nausea or Vomiting., Disp: 15 tablet, Rfl: 0    Allergies:  Allergies   Allergen Reactions   • Sulfa Antibiotics Hives   • Erythromycin Base Rash   • Penicillins Rash   • Tetracycline Hcl Rash       Social History:  Social History     Socioeconomic History   • Marital status:    Tobacco Use   • Smoking status: Never Smoker   • Smokeless tobacco: Never Used   Vaping Use   • Vaping Use: Never used   Substance and Sexual Activity   • Alcohol use: Never   • Drug use: Never   • Sexual activity: Defer       Family History:  History reviewed. No pertinent family history.      PVR:   49mL    Objective     Physical Exam:   Vital Signs:   Vitals:    08/08/22 1511   Weight: 62.6 kg (138 lb)   Height: 160 cm (62.99\")   PainSc: 0-No pain     Body mass index is 24.45 kg/m².     Physical Exam  Vitals and nursing note reviewed.   Constitutional:       Appearance: Normal appearance.   HENT:      Head: Normocephalic and atraumatic.   Cardiovascular:      Comments: Well perfused  Pulmonary:      Effort: Pulmonary effort is normal.   Abdominal:      General: Abdomen is flat.      Palpations: Abdomen is soft.   Musculoskeletal:         General: Normal range of motion.   Skin:     General: Skin is warm and dry.   Neurological:      General: No focal deficit present.      Mental Status: She is alert and oriented to person, place, and time. Mental status is at baseline.   Psychiatric:         Mood and Affect: " Mood normal.         Behavior: Behavior normal.         Thought Content: Thought content normal.         Judgment: Judgment normal.         Labs:   Brief Urine Lab Results  (Last result in the past 365 days)      Color   Clarity   Blood   Leuk Est   Nitrite   Protein   CREAT   Urine HCG        07/20/22 1655 Yellow   Clear   Trace   Trace   Negative   Negative                 Urine Culture    Urine Culture 2/5/22 7/20/22   Urine Culture >100,000 CFU/mL Enterobacter cloacae complex (A) No growth   (A) Abnormal value               Lab Results   Component Value Date    GLUCOSE 111 (H) 06/23/2022    CALCIUM 9.2 06/23/2022     06/23/2022    K 4.5 06/23/2022    CO2 27.0 06/23/2022     06/23/2022    BUN 33 (H) 06/23/2022    CREATININE 1.46 (H) 06/23/2022    EGFRIFNONA 30 (L) 02/07/2022    BCR 22.6 06/23/2022    ANIONGAP 9.0 06/23/2022       Lab Results   Component Value Date    WBC 9.03 06/23/2022    HGB 14.2 06/23/2022    HCT 43.8 06/23/2022    MCV 85.4 06/23/2022     06/23/2022       Images:   XR Chest 1 View    Result Date: 6/23/2022   1. No acute cardiopulmonary disease.   This report was finalized on 6/23/2022 11:01 AM by Manas Duran MD.        Measures:   Tobacco:   Ila Galaviz  reports that she has never smoked. She has never used smokeless tobacco.. I have educated her on the risk of diseases from using tobacco products.    Assessment / Plan      Assessment:  Mrs. Galaviz is a 86 y.o. female who presents today with lower urinary tract symptoms and history of UTI.  Single positive urine culture in 2/2022.  This is her only positive urine culture within the [facility] system dating to 2020.  She reports that he follows with primary care physician and nephrology and has urinalysis obtained frequently.  She denies significant symptoms at time when she is told she has an infection.  She denies hematuria.  She denies past urologic evaluation including instrumentation or procedure.  She does report  decreased water intake, irritative fluid intake and a history of constipation..    Diagnoses and all orders for this visit:    1. Lower urinary tract symptoms (LUTS) (Primary)    2. Recurrent UTI  -     POC Urinalysis Dipstick, Automated        Patient Education:     Today we discussed in depth the etiology and management of pelvic floor dysfunction.  We discussed the pelvic floor dysfunction is a common condition in which the patient is unable to relax or coordinate the muscles of the pelvic floor with urination, bowel movement or intercourse.  We discussed that this is a common condition seen both men and women.  We discussed the role of the pelvic musculature and that it supports the bladder, vagina, rectum.  We discussed the intricate and dynamic interplay between the pelvic floor and these associated organs.  Discussed that the pelvic floor musculature added support and stability for these organ systems but can also result in symptoms including muscle spasms, pain with urination, bowel movement or sexual intercourse.  We discussed that the causes of pelvic floor dysfunction are often multifactorial.  We discussed that the majority of her symptoms resulted after a pregnancy and that pregnancy can be a common cause of pelvic floor dysfunction but is often not the sole cause.  We discussed that it can often take a prolonged period of multimodal therapy to improve symptoms.  We discussed conservative treatment strategies including biofeedback, pelvic floor physical therapy, medications, relaxing techniques.  We discussed that improving bowel habits and constipation is important.       We also discussed in depth the etiology and management of urinary tract infection and recurrent urinary tract infection.  We discussed the important principal that the definition of a diagnosed UTI requires a urine culture.  We discussed that a presumptive diagnosis of UTI cannot be made with just signs symptoms or urinalysis.  We  discussed that urinalysis is not sufficient to diagnose a UTI.  We discussed the difference between unresolved versus recurrent urinary tract infection.  We discussed that recurrent UTIs typically defined as greater than 2 UTIs within 6 months or greater than 3 within 1 year.  Discussed that the evaluation for recurrent UTI should include a history physical exam and a urine culture.  We discussed the recurrent infections can be a single complicated UTI versus a reinfection.  We discussed the indication for evaluation of the urinary tract when recurrent infections are complicated, occur frequently, or and symptoms persist long after an infection is eradicated.  We discussed therapies to prevent recurrence of infections.  Discussed conservative therapies including increased hydration appropriate , urinary hygiene, decreasing constipation, improvement in pelvic floor related symptoms, decreasing irritative food/fluid intake.  We discussed topical vaginal estrogen and the role this plays in decreasing the rate of infection.  We discussed that topical vaginal estrogen decreases vaginal pH, normalizes the vaginal jade, and decreases contamination.    We discussed mechanisms to prevent infection including oral cranberry supplement, d-mannose, methenamine. We discussed the role for laboratory testing when on this therapy.   We discussed that we will unlikely be able to eradicate all infection but our goal is to decrease the number she has per year. Patient is understanding and agreeable with the plan of care above to decrease the rate of infections.      Follow Up:   Return if symptoms worsen or fail to improve.    I spent approximately 45 minutes providing clinical care for this patient; including review of patient's chart and provider documentation, face to face time spent with patient in examination room (obtaining history, performing physical exam, discussing diagnosis and management options), placing orders, and  completing patient documentation.     Jonnie Healy MD  [facility] [place]

## 2022-08-10 PROBLEM — R39.9 LOWER URINARY TRACT SYMPTOMS (LUTS): Status: ACTIVE | Noted: 2022-08-10

## 2022-09-27 ENCOUNTER — HOSPITAL ENCOUNTER (OUTPATIENT)
Facility: HOSPITAL | Age: 87
Setting detail: OBSERVATION
LOS: 1 days | Discharge: HOME | End: 2022-10-03
Attending: EMERGENCY MEDICINE | Admitting: INTERNAL MEDICINE

## 2022-09-27 ENCOUNTER — APPOINTMENT (OUTPATIENT)
Dept: GENERAL RADIOLOGY | Facility: HOSPITAL | Age: 87
End: 2022-09-27

## 2022-09-27 ENCOUNTER — APPOINTMENT (OUTPATIENT)
Dept: CT IMAGING | Facility: HOSPITAL | Age: 87
End: 2022-09-27

## 2022-09-27 DIAGNOSIS — R42 VERTIGO: ICD-10-CM

## 2022-09-27 DIAGNOSIS — R41.89 COGNITIVE CHANGES: Primary | ICD-10-CM

## 2022-09-27 DIAGNOSIS — N18.32 STAGE 3B CHRONIC KIDNEY DISEASE: ICD-10-CM

## 2022-09-27 DIAGNOSIS — R53.1 GENERALIZED WEAKNESS: ICD-10-CM

## 2022-09-27 DIAGNOSIS — R53.1 WEAKNESS: ICD-10-CM

## 2022-09-27 DIAGNOSIS — R26.2 INABILITY TO WALK: ICD-10-CM

## 2022-09-27 DIAGNOSIS — I42.8 NICM (NONISCHEMIC CARDIOMYOPATHY): ICD-10-CM

## 2022-09-27 PROBLEM — E03.9 HYPOTHYROIDISM (ACQUIRED): Status: ACTIVE | Noted: 2022-09-27

## 2022-09-27 LAB
ALBUMIN SERPL-MCNC: 4.4 G/DL (ref 3.5–5.2)
ALBUMIN/GLOB SERPL: 1.3 G/DL
ALP SERPL-CCNC: 73 U/L (ref 39–117)
ALT SERPL W P-5'-P-CCNC: 12 U/L (ref 1–33)
ANION GAP SERPL CALCULATED.3IONS-SCNC: 12 MMOL/L (ref 5–15)
AST SERPL-CCNC: 14 U/L (ref 1–32)
BACTERIA UR QL AUTO: NORMAL /HPF
BASOPHILS # BLD AUTO: 0.04 10*3/MM3 (ref 0–0.2)
BASOPHILS NFR BLD AUTO: 0.4 % (ref 0–1.5)
BILIRUB SERPL-MCNC: 0.7 MG/DL (ref 0–1.2)
BILIRUB UR QL STRIP: NEGATIVE
BUN SERPL-MCNC: 39 MG/DL (ref 8–23)
BUN/CREAT SERPL: 28.1 (ref 7–25)
CALCIUM SPEC-SCNC: 9.5 MG/DL (ref 8.6–10.5)
CHLORIDE SERPL-SCNC: 105 MMOL/L (ref 98–107)
CLARITY UR: CLEAR
CO2 SERPL-SCNC: 25 MMOL/L (ref 22–29)
COLOR UR: YELLOW
CREAT BLDA-MCNC: 1.7 MG/DL (ref 0.6–1.3)
CREAT SERPL-MCNC: 1.39 MG/DL (ref 0.57–1)
DEPRECATED RDW RBC AUTO: 43.5 FL (ref 37–54)
EGFRCR SERPLBLD CKD-EPI 2021: 37.3 ML/MIN/1.73
EOSINOPHIL # BLD AUTO: 0.2 10*3/MM3 (ref 0–0.4)
EOSINOPHIL NFR BLD AUTO: 2.2 % (ref 0.3–6.2)
ERYTHROCYTE [DISTWIDTH] IN BLOOD BY AUTOMATED COUNT: 13.9 % (ref 12.3–15.4)
GLOBULIN UR ELPH-MCNC: 3.4 GM/DL
GLUCOSE BLDC GLUCOMTR-MCNC: 90 MG/DL (ref 70–130)
GLUCOSE SERPL-MCNC: 107 MG/DL (ref 65–99)
GLUCOSE UR STRIP-MCNC: NEGATIVE MG/DL
HCT VFR BLD AUTO: 44.4 % (ref 34–46.6)
HGB BLD-MCNC: 14.7 G/DL (ref 12–15.9)
HGB UR QL STRIP.AUTO: NEGATIVE
HOLD SPECIMEN: NORMAL
HYALINE CASTS UR QL AUTO: NORMAL /LPF
IMM GRANULOCYTES # BLD AUTO: 0.02 10*3/MM3 (ref 0–0.05)
IMM GRANULOCYTES NFR BLD AUTO: 0.2 % (ref 0–0.5)
KETONES UR QL STRIP: NEGATIVE
LEUKOCYTE ESTERASE UR QL STRIP.AUTO: NEGATIVE
LYMPHOCYTES # BLD AUTO: 2.03 10*3/MM3 (ref 0.7–3.1)
LYMPHOCYTES NFR BLD AUTO: 22.8 % (ref 19.6–45.3)
MAGNESIUM SERPL-MCNC: 1.9 MG/DL (ref 1.6–2.4)
MCH RBC QN AUTO: 28.5 PG (ref 26.6–33)
MCHC RBC AUTO-ENTMCNC: 33.1 G/DL (ref 31.5–35.7)
MCV RBC AUTO: 86 FL (ref 79–97)
MONOCYTES # BLD AUTO: 0.43 10*3/MM3 (ref 0.1–0.9)
MONOCYTES NFR BLD AUTO: 4.8 % (ref 5–12)
NEUTROPHILS NFR BLD AUTO: 6.19 10*3/MM3 (ref 1.7–7)
NEUTROPHILS NFR BLD AUTO: 69.6 % (ref 42.7–76)
NITRITE UR QL STRIP: NEGATIVE
NRBC BLD AUTO-RTO: 0 /100 WBC (ref 0–0.2)
PH UR STRIP.AUTO: 6.5 [PH] (ref 5–8)
PLATELET # BLD AUTO: 278 10*3/MM3 (ref 140–450)
PMV BLD AUTO: 9.4 FL (ref 6–12)
POTASSIUM SERPL-SCNC: 4.3 MMOL/L (ref 3.5–5.2)
PROT SERPL-MCNC: 7.8 G/DL (ref 6–8.5)
PROT UR QL STRIP: ABNORMAL
QT INTERVAL: 446 MS
QTC INTERVAL: 504 MS
RBC # BLD AUTO: 5.16 10*6/MM3 (ref 3.77–5.28)
RBC # UR STRIP: NORMAL /HPF
REF LAB TEST METHOD: NORMAL
SODIUM SERPL-SCNC: 142 MMOL/L (ref 136–145)
SP GR UR STRIP: 1.01 (ref 1–1.03)
SQUAMOUS #/AREA URNS HPF: NORMAL /HPF
TROPONIN T SERPL-MCNC: <0.01 NG/ML (ref 0–0.03)
UROBILINOGEN UR QL STRIP: ABNORMAL
WBC # UR STRIP: NORMAL /HPF
WBC NRBC COR # BLD: 8.91 10*3/MM3 (ref 3.4–10.8)
WHOLE BLOOD HOLD COAG: NORMAL
WHOLE BLOOD HOLD SPECIMEN: NORMAL

## 2022-09-27 PROCEDURE — 70450 CT HEAD/BRAIN W/O DYE: CPT

## 2022-09-27 PROCEDURE — 82565 ASSAY OF CREATININE: CPT

## 2022-09-27 PROCEDURE — 99285 EMERGENCY DEPT VISIT HI MDM: CPT

## 2022-09-27 PROCEDURE — 81001 URINALYSIS AUTO W/SCOPE: CPT | Performed by: EMERGENCY MEDICINE

## 2022-09-27 PROCEDURE — 84484 ASSAY OF TROPONIN QUANT: CPT | Performed by: EMERGENCY MEDICINE

## 2022-09-27 PROCEDURE — 0 IOPAMIDOL PER 1 ML: Performed by: EMERGENCY MEDICINE

## 2022-09-27 PROCEDURE — 83735 ASSAY OF MAGNESIUM: CPT | Performed by: EMERGENCY MEDICINE

## 2022-09-27 PROCEDURE — 70498 CT ANGIOGRAPHY NECK: CPT

## 2022-09-27 PROCEDURE — 85025 COMPLETE CBC W/AUTO DIFF WBC: CPT | Performed by: EMERGENCY MEDICINE

## 2022-09-27 PROCEDURE — 99214 OFFICE O/P EST MOD 30 MIN: CPT

## 2022-09-27 PROCEDURE — 82962 GLUCOSE BLOOD TEST: CPT

## 2022-09-27 PROCEDURE — 93005 ELECTROCARDIOGRAM TRACING: CPT | Performed by: EMERGENCY MEDICINE

## 2022-09-27 PROCEDURE — 71045 X-RAY EXAM CHEST 1 VIEW: CPT

## 2022-09-27 PROCEDURE — 70496 CT ANGIOGRAPHY HEAD: CPT

## 2022-09-27 PROCEDURE — 80053 COMPREHEN METABOLIC PANEL: CPT | Performed by: EMERGENCY MEDICINE

## 2022-09-27 PROCEDURE — 99220 PR INITIAL OBSERVATION CARE/DAY 70 MINUTES: CPT | Performed by: INTERNAL MEDICINE

## 2022-09-27 RX ORDER — SACCHAROMYCES BOULARDII 250 MG
250 CAPSULE ORAL DAILY
Status: DISCONTINUED | OUTPATIENT
Start: 2022-09-28 | End: 2022-10-03 | Stop reason: HOSPADM

## 2022-09-27 RX ORDER — SODIUM CHLORIDE 0.9 % (FLUSH) 0.9 %
10 SYRINGE (ML) INJECTION EVERY 12 HOURS SCHEDULED
Status: DISCONTINUED | OUTPATIENT
Start: 2022-09-27 | End: 2022-10-03 | Stop reason: HOSPADM

## 2022-09-27 RX ORDER — SODIUM CHLORIDE 0.9 % (FLUSH) 0.9 %
10 SYRINGE (ML) INJECTION AS NEEDED
Status: DISCONTINUED | OUTPATIENT
Start: 2022-09-27 | End: 2022-10-03 | Stop reason: HOSPADM

## 2022-09-27 RX ORDER — TRAZODONE HYDROCHLORIDE 50 MG/1
50 TABLET ORAL NIGHTLY
Status: DISCONTINUED | OUTPATIENT
Start: 2022-09-27 | End: 2022-10-03 | Stop reason: HOSPADM

## 2022-09-27 RX ORDER — AMLODIPINE BESYLATE 2.5 MG/1
2.5 TABLET ORAL ONCE
Status: COMPLETED | OUTPATIENT
Start: 2022-09-27 | End: 2022-09-27

## 2022-09-27 RX ORDER — LEVOTHYROXINE SODIUM 112 UG/1
112 TABLET ORAL DAILY
Status: DISCONTINUED | OUTPATIENT
Start: 2022-09-28 | End: 2022-10-03 | Stop reason: HOSPADM

## 2022-09-27 RX ORDER — ONDANSETRON 2 MG/ML
4 INJECTION INTRAMUSCULAR; INTRAVENOUS EVERY 6 HOURS PRN
Status: DISCONTINUED | OUTPATIENT
Start: 2022-09-27 | End: 2022-10-03 | Stop reason: HOSPADM

## 2022-09-27 RX ORDER — ONDANSETRON 4 MG/1
4 TABLET, FILM COATED ORAL EVERY 6 HOURS PRN
Status: DISCONTINUED | OUTPATIENT
Start: 2022-09-27 | End: 2022-10-03 | Stop reason: HOSPADM

## 2022-09-27 RX ORDER — CARVEDILOL 6.25 MG/1
6.25 TABLET ORAL ONCE
Status: COMPLETED | OUTPATIENT
Start: 2022-09-27 | End: 2022-09-27

## 2022-09-27 RX ORDER — MONTELUKAST SODIUM 10 MG/1
10 TABLET ORAL NIGHTLY
Status: DISCONTINUED | OUTPATIENT
Start: 2022-09-27 | End: 2022-10-03 | Stop reason: HOSPADM

## 2022-09-27 RX ORDER — MECLIZINE HYDROCHLORIDE 25 MG/1
25 TABLET ORAL ONCE
Status: COMPLETED | OUTPATIENT
Start: 2022-09-27 | End: 2022-09-27

## 2022-09-27 RX ORDER — ROPINIROLE 1 MG/1
1 TABLET, FILM COATED ORAL 2 TIMES DAILY
Status: DISCONTINUED | OUTPATIENT
Start: 2022-09-27 | End: 2022-09-30

## 2022-09-27 RX ORDER — ACETAMINOPHEN 325 MG/1
650 TABLET ORAL EVERY 4 HOURS PRN
Status: DISCONTINUED | OUTPATIENT
Start: 2022-09-27 | End: 2022-10-03 | Stop reason: HOSPADM

## 2022-09-27 RX ORDER — HEPARIN SODIUM 5000 [USP'U]/ML
5000 INJECTION, SOLUTION INTRAVENOUS; SUBCUTANEOUS EVERY 8 HOURS SCHEDULED
Status: DISCONTINUED | OUTPATIENT
Start: 2022-09-28 | End: 2022-10-03 | Stop reason: HOSPADM

## 2022-09-27 RX ORDER — ASPIRIN 325 MG
325 TABLET ORAL DAILY
Status: DISCONTINUED | OUTPATIENT
Start: 2022-09-27 | End: 2022-09-29

## 2022-09-27 RX ORDER — LOSARTAN POTASSIUM 50 MG/1
50 TABLET ORAL
Status: DISCONTINUED | OUTPATIENT
Start: 2022-09-27 | End: 2022-10-03 | Stop reason: HOSPADM

## 2022-09-27 RX ORDER — PRAMIPEXOLE DIHYDROCHLORIDE 1 MG/1
1 TABLET ORAL DAILY
Status: DISCONTINUED | OUTPATIENT
Start: 2022-09-28 | End: 2022-10-03 | Stop reason: HOSPADM

## 2022-09-27 RX ORDER — SODIUM CHLORIDE 9 MG/ML
50 INJECTION, SOLUTION INTRAVENOUS CONTINUOUS
Status: ACTIVE | OUTPATIENT
Start: 2022-09-27 | End: 2022-09-28

## 2022-09-27 RX ORDER — AMLODIPINE BESYLATE 2.5 MG/1
TABLET ORAL
COMMUNITY

## 2022-09-27 RX ORDER — ACETAMINOPHEN 650 MG/1
650 SUPPOSITORY RECTAL EVERY 4 HOURS PRN
Status: DISCONTINUED | OUTPATIENT
Start: 2022-09-27 | End: 2022-10-02

## 2022-09-27 RX ORDER — ATORVASTATIN CALCIUM 40 MG/1
80 TABLET, FILM COATED ORAL NIGHTLY
Status: DISCONTINUED | OUTPATIENT
Start: 2022-09-27 | End: 2022-09-29

## 2022-09-27 RX ORDER — ASPIRIN 300 MG/1
300 SUPPOSITORY RECTAL DAILY
Status: DISCONTINUED | OUTPATIENT
Start: 2022-09-27 | End: 2022-09-29

## 2022-09-27 RX ORDER — ACETAMINOPHEN 160 MG/5ML
650 SOLUTION ORAL EVERY 4 HOURS PRN
Status: DISCONTINUED | OUTPATIENT
Start: 2022-09-27 | End: 2022-10-02

## 2022-09-27 RX ADMIN — ACETAMINOPHEN 650 MG: 325 TABLET, FILM COATED ORAL at 23:14

## 2022-09-27 RX ADMIN — MONTELUKAST 10 MG: 10 TABLET, FILM COATED ORAL at 20:25

## 2022-09-27 RX ADMIN — LOSARTAN POTASSIUM 50 MG: 50 TABLET, FILM COATED ORAL at 10:15

## 2022-09-27 RX ADMIN — ACETAMINOPHEN 650 MG: 325 TABLET, FILM COATED ORAL at 16:57

## 2022-09-27 RX ADMIN — ROPINIROLE HYDROCHLORIDE 1 MG: 1 TABLET, FILM COATED ORAL at 20:25

## 2022-09-27 RX ADMIN — AMLODIPINE BESYLATE 2.5 MG: 2.5 TABLET ORAL at 10:16

## 2022-09-27 RX ADMIN — IOPAMIDOL 75 ML: 755 INJECTION, SOLUTION INTRAVENOUS at 11:55

## 2022-09-27 RX ADMIN — SODIUM CHLORIDE 1000 ML: 9 INJECTION, SOLUTION INTRAVENOUS at 11:21

## 2022-09-27 RX ADMIN — ATORVASTATIN CALCIUM 80 MG: 40 TABLET, FILM COATED ORAL at 20:24

## 2022-09-27 RX ADMIN — SODIUM CHLORIDE 50 ML/HR: 9 INJECTION, SOLUTION INTRAVENOUS at 16:56

## 2022-09-27 RX ADMIN — Medication 10 ML: at 16:01

## 2022-09-27 RX ADMIN — ASPIRIN 325 MG: 325 TABLET ORAL at 16:57

## 2022-09-27 RX ADMIN — CARVEDILOL 6.25 MG: 6.25 TABLET, FILM COATED ORAL at 10:15

## 2022-09-27 RX ADMIN — TRAZODONE HYDROCHLORIDE 50 MG: 50 TABLET ORAL at 20:25

## 2022-09-27 RX ADMIN — Medication 10 ML: at 20:24

## 2022-09-27 RX ADMIN — MECLIZINE HYDROCHLORIDE 25 MG: 25 TABLET ORAL at 10:15

## 2022-09-28 ENCOUNTER — APPOINTMENT (OUTPATIENT)
Dept: CARDIOLOGY | Facility: HOSPITAL | Age: 87
End: 2022-09-28

## 2022-09-28 ENCOUNTER — APPOINTMENT (OUTPATIENT)
Dept: CT IMAGING | Facility: HOSPITAL | Age: 87
End: 2022-09-28

## 2022-09-28 ENCOUNTER — TELEPHONE (OUTPATIENT)
Dept: CARDIOLOGY | Facility: CLINIC | Age: 87
End: 2022-09-28

## 2022-09-28 LAB
ANION GAP SERPL CALCULATED.3IONS-SCNC: 12 MMOL/L (ref 5–15)
BH CV ECHO MEAS - AI P1/2T: 397.8 MSEC
BH CV ECHO MEAS - AO MAX PG: 6.3 MMHG
BH CV ECHO MEAS - AO MEAN PG: 3 MMHG
BH CV ECHO MEAS - AO ROOT DIAM: 3.3 CM
BH CV ECHO MEAS - AO V2 MAX: 125 CM/SEC
BH CV ECHO MEAS - AO V2 VTI: 23.2 CM
BH CV ECHO MEAS - AVA(I,D): 2.07 CM2
BH CV ECHO MEAS - EDV(CUBED): 117.6 ML
BH CV ECHO MEAS - EDV(MOD-SP2): 59.6 ML
BH CV ECHO MEAS - EDV(MOD-SP4): 47.1 ML
BH CV ECHO MEAS - EF(MOD-BP): 52.4 %
BH CV ECHO MEAS - EF(MOD-SP2): 55 %
BH CV ECHO MEAS - EF(MOD-SP4): 52.2 %
BH CV ECHO MEAS - ESV(CUBED): 24.4 ML
BH CV ECHO MEAS - ESV(MOD-SP2): 26.8 ML
BH CV ECHO MEAS - ESV(MOD-SP4): 22.5 ML
BH CV ECHO MEAS - FS: 40.8 %
BH CV ECHO MEAS - IVS/LVPW: 1 CM
BH CV ECHO MEAS - IVSD: 1.1 CM
BH CV ECHO MEAS - LA DIMENSION: 4.4 CM
BH CV ECHO MEAS - LAT PEAK E' VEL: 7.8 CM/SEC
BH CV ECHO MEAS - LV DIASTOLIC VOL/BSA (35-75): 28 CM2
BH CV ECHO MEAS - LV MASS(C)D: 200.5 GRAMS
BH CV ECHO MEAS - LV MAX PG: 2.6 MMHG
BH CV ECHO MEAS - LV MEAN PG: 1 MMHG
BH CV ECHO MEAS - LV SYSTOLIC VOL/BSA (12-30): 13.4 CM2
BH CV ECHO MEAS - LV V1 MAX: 81.2 CM/SEC
BH CV ECHO MEAS - LV V1 VTI: 15.3 CM
BH CV ECHO MEAS - LVIDD: 4.9 CM
BH CV ECHO MEAS - LVIDS: 2.9 CM
BH CV ECHO MEAS - LVOT AREA: 3.1 CM2
BH CV ECHO MEAS - LVOT DIAM: 2 CM
BH CV ECHO MEAS - LVPWD: 1.1 CM
BH CV ECHO MEAS - MED PEAK E' VEL: 4.4 CM/SEC
BH CV ECHO MEAS - MV A MAX VEL: 148 CM/SEC
BH CV ECHO MEAS - MV DEC SLOPE: 331 CM/SEC2
BH CV ECHO MEAS - MV DEC TIME: 0.29 MSEC
BH CV ECHO MEAS - MV E MAX VEL: 81.8 CM/SEC
BH CV ECHO MEAS - MV E/A: 0.55
BH CV ECHO MEAS - MV MAX PG: 11.2 MMHG
BH CV ECHO MEAS - MV MEAN PG: 5 MMHG
BH CV ECHO MEAS - MV P1/2T: 87.2 MSEC
BH CV ECHO MEAS - MV V2 VTI: 43 CM
BH CV ECHO MEAS - MVA(P1/2T): 2.5 CM2
BH CV ECHO MEAS - MVA(VTI): 1.12 CM2
BH CV ECHO MEAS - PA ACC TIME: 0.1 SEC
BH CV ECHO MEAS - PA PR(ACCEL): 33.1 MMHG
BH CV ECHO MEAS - RAP SYSTOLE: 3 MMHG
BH CV ECHO MEAS - RVSP: 24 MMHG
BH CV ECHO MEAS - SI(MOD-SP2): 19.5 ML/M2
BH CV ECHO MEAS - SI(MOD-SP4): 14.6 ML/M2
BH CV ECHO MEAS - SV(LVOT): 48.1 ML
BH CV ECHO MEAS - SV(MOD-SP2): 32.8 ML
BH CV ECHO MEAS - SV(MOD-SP4): 24.6 ML
BH CV ECHO MEAS - TAPSE (>1.6): 1.27 CM
BH CV ECHO MEAS - TR MAX PG: 20.9 MMHG
BH CV ECHO MEAS - TR MAX VEL: 228.5 CM/SEC
BH CV ECHO MEASUREMENTS AVERAGE E/E' RATIO: 13.41
BH CV XLRA - RV BASE: 3.5 CM
BH CV XLRA - RV LENGTH: 7 CM
BH CV XLRA - RV MID: 2.4 CM
BH CV XLRA - TDI S': 6.6 CM/SEC
BUN SERPL-MCNC: 35 MG/DL (ref 8–23)
BUN/CREAT SERPL: 21.7 (ref 7–25)
CALCIUM SPEC-SCNC: 8.5 MG/DL (ref 8.6–10.5)
CHLORIDE SERPL-SCNC: 106 MMOL/L (ref 98–107)
CHOLEST SERPL-MCNC: 188 MG/DL (ref 0–200)
CO2 SERPL-SCNC: 23 MMOL/L (ref 22–29)
CREAT SERPL-MCNC: 1.61 MG/DL (ref 0.57–1)
DEPRECATED RDW RBC AUTO: 44.1 FL (ref 37–54)
EGFRCR SERPLBLD CKD-EPI 2021: 31.2 ML/MIN/1.73
ERYTHROCYTE [DISTWIDTH] IN BLOOD BY AUTOMATED COUNT: 14.1 % (ref 12.3–15.4)
GLUCOSE SERPL-MCNC: 94 MG/DL (ref 65–99)
HBA1C MFR BLD: 6.2 % (ref 4.8–5.6)
HCT VFR BLD AUTO: 38.4 % (ref 34–46.6)
HDLC SERPL-MCNC: 38 MG/DL (ref 40–60)
HGB BLD-MCNC: 12.7 G/DL (ref 12–15.9)
LDLC SERPL CALC-MCNC: 122 MG/DL (ref 0–100)
LDLC/HDLC SERPL: 3.12 {RATIO}
LEFT ATRIUM VOLUME INDEX: 47.7 ML/M2
MAXIMAL PREDICTED HEART RATE: 135 BPM
MCH RBC QN AUTO: 28.5 PG (ref 26.6–33)
MCHC RBC AUTO-ENTMCNC: 33.1 G/DL (ref 31.5–35.7)
MCV RBC AUTO: 86.1 FL (ref 79–97)
PLATELET # BLD AUTO: 253 10*3/MM3 (ref 140–450)
PMV BLD AUTO: 9.4 FL (ref 6–12)
POTASSIUM SERPL-SCNC: 4 MMOL/L (ref 3.5–5.2)
RBC # BLD AUTO: 4.46 10*6/MM3 (ref 3.77–5.28)
SODIUM SERPL-SCNC: 141 MMOL/L (ref 136–145)
STRESS TARGET HR: 115 BPM
T4 FREE SERPL-MCNC: 1.57 NG/DL (ref 0.93–1.7)
TRIGL SERPL-MCNC: 158 MG/DL (ref 0–150)
TSH SERPL DL<=0.05 MIU/L-ACNC: 2.07 UIU/ML (ref 0.27–4.2)
VLDLC SERPL-MCNC: 28 MG/DL (ref 5–40)
WBC NRBC COR # BLD: 8.6 10*3/MM3 (ref 3.4–10.8)

## 2022-09-28 PROCEDURE — 80061 LIPID PANEL: CPT

## 2022-09-28 PROCEDURE — 97535 SELF CARE MNGMENT TRAINING: CPT

## 2022-09-28 PROCEDURE — G0378 HOSPITAL OBSERVATION PER HR: HCPCS

## 2022-09-28 PROCEDURE — 97166 OT EVAL MOD COMPLEX 45 MIN: CPT

## 2022-09-28 PROCEDURE — 84439 ASSAY OF FREE THYROXINE: CPT | Performed by: INTERNAL MEDICINE

## 2022-09-28 PROCEDURE — 83036 HEMOGLOBIN GLYCOSYLATED A1C: CPT | Performed by: INTERNAL MEDICINE

## 2022-09-28 PROCEDURE — 93306 TTE W/DOPPLER COMPLETE: CPT

## 2022-09-28 PROCEDURE — 70450 CT HEAD/BRAIN W/O DYE: CPT

## 2022-09-28 PROCEDURE — 97530 THERAPEUTIC ACTIVITIES: CPT

## 2022-09-28 PROCEDURE — 80048 BASIC METABOLIC PNL TOTAL CA: CPT | Performed by: INTERNAL MEDICINE

## 2022-09-28 PROCEDURE — 99214 OFFICE O/P EST MOD 30 MIN: CPT | Performed by: PSYCHIATRY & NEUROLOGY

## 2022-09-28 PROCEDURE — 92523 SPEECH SOUND LANG COMPREHEN: CPT

## 2022-09-28 PROCEDURE — 25010000002 HEPARIN (PORCINE) PER 1000 UNITS: Performed by: INTERNAL MEDICINE

## 2022-09-28 PROCEDURE — 93306 TTE W/DOPPLER COMPLETE: CPT | Performed by: INTERNAL MEDICINE

## 2022-09-28 PROCEDURE — 97162 PT EVAL MOD COMPLEX 30 MIN: CPT

## 2022-09-28 PROCEDURE — 85027 COMPLETE CBC AUTOMATED: CPT | Performed by: INTERNAL MEDICINE

## 2022-09-28 PROCEDURE — 99224 PR SBSQ OBSERVATION CARE/DAY 15 MINUTES: CPT | Performed by: FAMILY MEDICINE

## 2022-09-28 PROCEDURE — 96372 THER/PROPH/DIAG INJ SC/IM: CPT

## 2022-09-28 PROCEDURE — 84443 ASSAY THYROID STIM HORMONE: CPT | Performed by: INTERNAL MEDICINE

## 2022-09-28 RX ORDER — TRAMADOL HYDROCHLORIDE 50 MG/1
50 TABLET ORAL EVERY 4 HOURS PRN
Status: DISCONTINUED | OUTPATIENT
Start: 2022-09-28 | End: 2022-10-03 | Stop reason: HOSPADM

## 2022-09-28 RX ADMIN — LEVOTHYROXINE SODIUM 112 MCG: 0.11 TABLET ORAL at 09:17

## 2022-09-28 RX ADMIN — ASPIRIN 325 MG: 325 TABLET ORAL at 09:17

## 2022-09-28 RX ADMIN — ROPINIROLE HYDROCHLORIDE 1 MG: 1 TABLET, FILM COATED ORAL at 21:09

## 2022-09-28 RX ADMIN — Medication 10 ML: at 09:18

## 2022-09-28 RX ADMIN — PRAMIPEXOLE DIHYDROCHLORIDE 1 MG: 1 TABLET ORAL at 09:17

## 2022-09-28 RX ADMIN — MONTELUKAST 10 MG: 10 TABLET, FILM COATED ORAL at 21:10

## 2022-09-28 RX ADMIN — LOSARTAN POTASSIUM 50 MG: 50 TABLET, FILM COATED ORAL at 09:17

## 2022-09-28 RX ADMIN — HEPARIN SODIUM 5000 UNITS: 5000 INJECTION INTRAVENOUS; SUBCUTANEOUS at 21:09

## 2022-09-28 RX ADMIN — Medication 250 MG: at 09:17

## 2022-09-28 RX ADMIN — HEPARIN SODIUM 5000 UNITS: 5000 INJECTION INTRAVENOUS; SUBCUTANEOUS at 13:16

## 2022-09-28 RX ADMIN — Medication 10 ML: at 21:12

## 2022-09-28 RX ADMIN — TRAZODONE HYDROCHLORIDE 50 MG: 50 TABLET ORAL at 21:13

## 2022-09-28 RX ADMIN — ROPINIROLE HYDROCHLORIDE 1 MG: 1 TABLET, FILM COATED ORAL at 09:17

## 2022-09-28 NOTE — TELEPHONE ENCOUNTER
Pt's [employer] pacemaker is followed by [facility]. Per Vamsi Blanco RN, [employer] clinical specialist, pt's pacemaker is NOT MRI conditional secondary to an abandoned lead.     I've spoken with pt's daughter, Joycelyn Mccloud, to notify her of the above information. Ms. Mccloud verbalized understanding.     MRI Pacemaker form completed & scanned into pt's chart stating the above information.

## 2022-09-29 PROCEDURE — 92507 TX SP LANG VOICE COMM INDIV: CPT

## 2022-09-29 PROCEDURE — 97110 THERAPEUTIC EXERCISES: CPT

## 2022-09-29 PROCEDURE — G0378 HOSPITAL OBSERVATION PER HR: HCPCS

## 2022-09-29 PROCEDURE — 25010000002 HEPARIN (PORCINE) PER 1000 UNITS: Performed by: INTERNAL MEDICINE

## 2022-09-29 PROCEDURE — 99225 PR SBSQ OBSERVATION CARE/DAY 25 MINUTES: CPT | Performed by: FAMILY MEDICINE

## 2022-09-29 PROCEDURE — 96372 THER/PROPH/DIAG INJ SC/IM: CPT

## 2022-09-29 PROCEDURE — 99213 OFFICE O/P EST LOW 20 MIN: CPT | Performed by: NURSE PRACTITIONER

## 2022-09-29 PROCEDURE — 97116 GAIT TRAINING THERAPY: CPT

## 2022-09-29 RX ORDER — ATORVASTATIN CALCIUM 40 MG/1
40 TABLET, FILM COATED ORAL NIGHTLY
Status: DISCONTINUED | OUTPATIENT
Start: 2022-09-29 | End: 2022-10-02

## 2022-09-29 RX ADMIN — HEPARIN SODIUM 5000 UNITS: 5000 INJECTION INTRAVENOUS; SUBCUTANEOUS at 05:30

## 2022-09-29 RX ADMIN — LEVOTHYROXINE SODIUM 112 MCG: 0.11 TABLET ORAL at 08:05

## 2022-09-29 RX ADMIN — Medication 10 ML: at 21:41

## 2022-09-29 RX ADMIN — PRAMIPEXOLE DIHYDROCHLORIDE 1 MG: 1 TABLET ORAL at 08:04

## 2022-09-29 RX ADMIN — Medication 10 ML: at 08:05

## 2022-09-29 RX ADMIN — MONTELUKAST 10 MG: 10 TABLET, FILM COATED ORAL at 21:38

## 2022-09-29 RX ADMIN — ASPIRIN 325 MG: 325 TABLET ORAL at 08:05

## 2022-09-29 RX ADMIN — ROPINIROLE HYDROCHLORIDE 1 MG: 1 TABLET, FILM COATED ORAL at 08:05

## 2022-09-29 RX ADMIN — ROPINIROLE HYDROCHLORIDE 1 MG: 1 TABLET, FILM COATED ORAL at 21:38

## 2022-09-29 RX ADMIN — HEPARIN SODIUM 5000 UNITS: 5000 INJECTION INTRAVENOUS; SUBCUTANEOUS at 13:55

## 2022-09-29 RX ADMIN — Medication 250 MG: at 08:05

## 2022-09-29 RX ADMIN — ATORVASTATIN CALCIUM 40 MG: 40 TABLET, FILM COATED ORAL at 21:38

## 2022-09-29 RX ADMIN — TRAZODONE HYDROCHLORIDE 50 MG: 50 TABLET ORAL at 21:38

## 2022-09-29 RX ADMIN — LOSARTAN POTASSIUM 50 MG: 50 TABLET, FILM COATED ORAL at 08:05

## 2022-09-29 RX ADMIN — HEPARIN SODIUM 5000 UNITS: 5000 INJECTION INTRAVENOUS; SUBCUTANEOUS at 21:38

## 2022-09-30 PROBLEM — R73.03 PRE-DIABETES: Status: ACTIVE | Noted: 2022-09-30

## 2022-09-30 PROBLEM — G25.81 RLS (RESTLESS LEGS SYNDROME): Status: ACTIVE | Noted: 2022-09-30

## 2022-09-30 LAB — CK SERPL-CCNC: 58 U/L (ref 20–180)

## 2022-09-30 PROCEDURE — 96372 THER/PROPH/DIAG INJ SC/IM: CPT

## 2022-09-30 PROCEDURE — 25010000002 HEPARIN (PORCINE) PER 1000 UNITS: Performed by: INTERNAL MEDICINE

## 2022-09-30 PROCEDURE — 99225 PR SBSQ OBSERVATION CARE/DAY 25 MINUTES: CPT | Performed by: INTERNAL MEDICINE

## 2022-09-30 PROCEDURE — G0378 HOSPITAL OBSERVATION PER HR: HCPCS

## 2022-09-30 PROCEDURE — 82550 ASSAY OF CK (CPK): CPT | Performed by: INTERNAL MEDICINE

## 2022-09-30 RX ORDER — BISACODYL 10 MG
10 SUPPOSITORY, RECTAL RECTAL DAILY PRN
Status: DISCONTINUED | OUTPATIENT
Start: 2022-09-30 | End: 2022-10-02

## 2022-09-30 RX ORDER — AMLODIPINE BESYLATE 2.5 MG/1
2.5 TABLET ORAL
Status: DISCONTINUED | OUTPATIENT
Start: 2022-09-30 | End: 2022-10-03 | Stop reason: HOSPADM

## 2022-09-30 RX ORDER — POLYETHYLENE GLYCOL 3350 17 G/17G
17 POWDER, FOR SOLUTION ORAL DAILY PRN
Status: DISCONTINUED | OUTPATIENT
Start: 2022-09-30 | End: 2022-10-02

## 2022-09-30 RX ORDER — BISACODYL 5 MG/1
5 TABLET, DELAYED RELEASE ORAL DAILY PRN
Status: DISCONTINUED | OUTPATIENT
Start: 2022-09-30 | End: 2022-10-02

## 2022-09-30 RX ORDER — AMOXICILLIN 250 MG
2 CAPSULE ORAL 2 TIMES DAILY
Status: DISCONTINUED | OUTPATIENT
Start: 2022-09-30 | End: 2022-10-02

## 2022-09-30 RX ORDER — MECLIZINE HCL 12.5 MG/1
12.5 TABLET ORAL 3 TIMES DAILY PRN
Status: DISCONTINUED | OUTPATIENT
Start: 2022-09-30 | End: 2022-10-03 | Stop reason: HOSPADM

## 2022-09-30 RX ORDER — ROPINIROLE 1 MG/1
1 TABLET, FILM COATED ORAL NIGHTLY
Status: DISCONTINUED | OUTPATIENT
Start: 2022-09-30 | End: 2022-10-03 | Stop reason: HOSPADM

## 2022-09-30 RX ADMIN — Medication 250 MG: at 08:18

## 2022-09-30 RX ADMIN — LEVOTHYROXINE SODIUM 112 MCG: 0.11 TABLET ORAL at 08:18

## 2022-09-30 RX ADMIN — ROPINIROLE HYDROCHLORIDE 1 MG: 1 TABLET, FILM COATED ORAL at 22:46

## 2022-09-30 RX ADMIN — Medication 10 ML: at 08:21

## 2022-09-30 RX ADMIN — DOCUSATE SODIUM 50 MG AND SENNOSIDES 8.6 MG 2 TABLET: 8.6; 5 TABLET, FILM COATED ORAL at 11:58

## 2022-09-30 RX ADMIN — HEPARIN SODIUM 5000 UNITS: 5000 INJECTION INTRAVENOUS; SUBCUTANEOUS at 05:49

## 2022-09-30 RX ADMIN — TRAZODONE HYDROCHLORIDE 50 MG: 50 TABLET ORAL at 22:52

## 2022-09-30 RX ADMIN — ATORVASTATIN CALCIUM 40 MG: 40 TABLET, FILM COATED ORAL at 22:46

## 2022-09-30 RX ADMIN — AMLODIPINE BESYLATE 2.5 MG: 2.5 TABLET ORAL at 16:10

## 2022-09-30 RX ADMIN — MONTELUKAST 10 MG: 10 TABLET, FILM COATED ORAL at 22:46

## 2022-09-30 RX ADMIN — PRAMIPEXOLE DIHYDROCHLORIDE 1 MG: 1 TABLET ORAL at 08:18

## 2022-09-30 RX ADMIN — HEPARIN SODIUM 5000 UNITS: 5000 INJECTION INTRAVENOUS; SUBCUTANEOUS at 22:45

## 2022-09-30 RX ADMIN — ROPINIROLE HYDROCHLORIDE 1 MG: 1 TABLET, FILM COATED ORAL at 08:18

## 2022-09-30 RX ADMIN — HEPARIN SODIUM 5000 UNITS: 5000 INJECTION INTRAVENOUS; SUBCUTANEOUS at 14:24

## 2022-09-30 RX ADMIN — LOSARTAN POTASSIUM 50 MG: 50 TABLET, FILM COATED ORAL at 08:20

## 2022-10-01 LAB
ANION GAP SERPL CALCULATED.3IONS-SCNC: 10 MMOL/L (ref 5–15)
BUN SERPL-MCNC: 32 MG/DL (ref 8–23)
BUN/CREAT SERPL: 19.3 (ref 7–25)
CALCIUM SPEC-SCNC: 9 MG/DL (ref 8.6–10.5)
CHLORIDE SERPL-SCNC: 105 MMOL/L (ref 98–107)
CO2 SERPL-SCNC: 24 MMOL/L (ref 22–29)
CREAT SERPL-MCNC: 1.66 MG/DL (ref 0.57–1)
EGFRCR SERPLBLD CKD-EPI 2021: 30.1 ML/MIN/1.73
GLUCOSE SERPL-MCNC: 95 MG/DL (ref 65–99)
POTASSIUM SERPL-SCNC: 4.8 MMOL/L (ref 3.5–5.2)
SODIUM SERPL-SCNC: 139 MMOL/L (ref 136–145)

## 2022-10-01 PROCEDURE — G0378 HOSPITAL OBSERVATION PER HR: HCPCS

## 2022-10-01 PROCEDURE — 99225 PR SBSQ OBSERVATION CARE/DAY 25 MINUTES: CPT | Performed by: INTERNAL MEDICINE

## 2022-10-01 PROCEDURE — 25010000002 HEPARIN (PORCINE) PER 1000 UNITS: Performed by: INTERNAL MEDICINE

## 2022-10-01 PROCEDURE — 96372 THER/PROPH/DIAG INJ SC/IM: CPT

## 2022-10-01 PROCEDURE — 80048 BASIC METABOLIC PNL TOTAL CA: CPT | Performed by: INTERNAL MEDICINE

## 2022-10-01 RX ORDER — ESCITALOPRAM OXALATE 10 MG/1
5 TABLET ORAL DAILY
Status: DISCONTINUED | OUTPATIENT
Start: 2022-10-01 | End: 2022-10-03 | Stop reason: HOSPADM

## 2022-10-01 RX ADMIN — ESCITALOPRAM OXALATE 5 MG: 10 TABLET ORAL at 12:48

## 2022-10-01 RX ADMIN — HEPARIN SODIUM 5000 UNITS: 5000 INJECTION INTRAVENOUS; SUBCUTANEOUS at 20:21

## 2022-10-01 RX ADMIN — ROPINIROLE HYDROCHLORIDE 1 MG: 1 TABLET, FILM COATED ORAL at 20:21

## 2022-10-01 RX ADMIN — ACETAMINOPHEN 650 MG: 325 TABLET, FILM COATED ORAL at 20:20

## 2022-10-01 RX ADMIN — LOSARTAN POTASSIUM 50 MG: 50 TABLET, FILM COATED ORAL at 10:02

## 2022-10-01 RX ADMIN — PRAMIPEXOLE DIHYDROCHLORIDE 1 MG: 1 TABLET ORAL at 10:04

## 2022-10-01 RX ADMIN — TRAZODONE HYDROCHLORIDE 50 MG: 50 TABLET ORAL at 20:21

## 2022-10-01 RX ADMIN — AMLODIPINE BESYLATE 2.5 MG: 2.5 TABLET ORAL at 10:02

## 2022-10-01 RX ADMIN — MONTELUKAST 10 MG: 10 TABLET, FILM COATED ORAL at 20:21

## 2022-10-01 RX ADMIN — Medication 10 ML: at 20:21

## 2022-10-01 RX ADMIN — ATORVASTATIN CALCIUM 40 MG: 40 TABLET, FILM COATED ORAL at 20:20

## 2022-10-01 RX ADMIN — ACETAMINOPHEN 650 MG: 325 TABLET, FILM COATED ORAL at 05:46

## 2022-10-01 RX ADMIN — HEPARIN SODIUM 5000 UNITS: 5000 INJECTION INTRAVENOUS; SUBCUTANEOUS at 13:01

## 2022-10-01 RX ADMIN — LEVOTHYROXINE SODIUM 112 MCG: 0.11 TABLET ORAL at 10:03

## 2022-10-01 RX ADMIN — HEPARIN SODIUM 5000 UNITS: 5000 INJECTION INTRAVENOUS; SUBCUTANEOUS at 06:49

## 2022-10-01 RX ADMIN — Medication 250 MG: at 10:03

## 2022-10-01 NOTE — PROGRESS NOTES
"    [facility] Medicine Services  PROGRESS NOTE    Patient Name: Ila Galaviz  : 1935  MRN: [id number]    Date of Admission: 2022  Primary Care Physician: Erin Rubio APRN    Subjective   Subjective     CC:  F/u dizziness     HPI:  Patient reports a large fear of falling, anxiety about getting up but does well once up. Feels \"weak in her legs\" but continues to work on it.  We discussed her overall stress and she becomes teary discussing caregiver fatigue.    ROS:  Gen- No fevers, chills  CV- No chest pain, palpitations  Resp- No cough, dyspnea    Objective   Objective     Vital Signs:   Temp:  [97.6 °F (36.4 °C)-98.1 °F (36.7 °C)] 97.9 °F (36.6 °C)  Heart Rate:  [] 107  Resp:  [16-20] 20  BP: (142-178)/(74-99) 142/93  Flow (L/min):  [1] 1     Physical Exam:  Constitutional: No acute distress, awake, elderly female sitting up in bed  HENT: NCAT, mucous membranes moist  Respiratory: Clear to auscultation bilaterally, respiratory effort normal   Cardiovascular: RRR, no murmurs, rubs, or gallops. Pacemaker pocket left  Gastrointestinal:  Nondistended, soft, nontender  Musculoskeletal: No bilateral ankle edema, decreased muscle tone throughout  Psychiatric: Appropriate affect, cooperative  Neurologic: Oriented x 3, speech clear. 5/5 strength bilateral LE on my exam  Skin: No rashes    Results Reviewed:  LAB RESULTS:      Lab 22  0456 22  0959   WBC 8.60 8.91   HEMOGLOBIN 12.7 14.7   HEMATOCRIT 38.4 44.4   PLATELETS 253 278   NEUTROS ABS  --  6.19   IMMATURE GRANS (ABS)  --  0.02   LYMPHS ABS  --  2.03   MONOS ABS  --  0.43   EOS ABS  --  0.20   MCV 86.1 86.0         Lab 10/01/22  0503 22  0456 22  1015 22  0959   SODIUM 139 141  --  142   POTASSIUM 4.8 4.0  --  4.3   CHLORIDE 105 106  --  105   CO2 24.0 23.0  --  25.0   ANION GAP 10.0 12.0  --  12.0   BUN 32* 35*  --  39*   CREATININE 1.66* 1.61* 1.70* 1.39*   EGFR 30.1* 31.2*  --  37.3* "   GLUCOSE 95 94  --  107*   CALCIUM 9.0 8.5*  --  9.5   MAGNESIUM  --   --   --  1.9   HEMOGLOBIN A1C  --  6.20*  --   --    TSH  --  2.070  --   --          Lab 09/27/22  0959   TOTAL PROTEIN 7.8   ALBUMIN 4.40   GLOBULIN 3.4   ALT (SGPT) 12   AST (SGOT) 14   BILIRUBIN 0.7   ALK PHOS 73         Lab 09/27/22  0959   TROPONIN T <0.010         Lab 09/28/22  0456   CHOLESTEROL 188   LDL CHOL 122*   HDL CHOL 38*   TRIGLYCERIDES 158*             Brief Urine Lab Results  (Last result in the past 365 days)      Color   Clarity   Blood   Leuk Est   Nitrite   Protein   CREAT   Urine HCG        09/27/22 1019 Yellow   Clear   Negative   Negative   Negative   30 mg/dL (1+)                 Microbiology Results Abnormal     None          No radiology results from the last 24 hrs    Results for orders placed during the hospital encounter of 09/27/22    Adult Transthoracic Echo Complete W/ Cont if Necessary Per Protocol (With Agitated Saline)    Interpretation Summary  · Left ventricular ejection fraction appears to be 51 - 55%.  · Left ventricular wall thickness is consistent with mild concentric hypertrophy.  · Left atrial volume is moderately increased.  · Mild mitral valve stenosis is present. The mitral valve peak gradient is 11.2 mmHg. The mitral valve mean gradient is 5 mmHg. The mitral valve area (PHT) is 2.5 cm2.  · Left ventricular diastolic function is consistent with (grade I) impaired relaxation.  · Mild to moderate aortic valve regurgitation is present  · Electronic lead present in the right ventricle      I have reviewed the medications:  Scheduled Meds:amLODIPine, 2.5 mg, Oral, Q24H  atorvastatin, 40 mg, Oral, Nightly  escitalopram, 5 mg, Oral, Daily  heparin (porcine), 5,000 Units, Subcutaneous, Q8H  levothyroxine, 112 mcg, Oral, Daily  losartan, 50 mg, Oral, Q24H  montelukast, 10 mg, Oral, Nightly  pramipexole, 1 mg, Oral, Daily  rOPINIRole, 1 mg, Oral, Nightly  saccharomyces boulardii, 250 mg, Oral,  Daily  senna-docusate sodium, 2 tablet, Oral, BID  sodium chloride, 10 mL, Intravenous, Q12H  sodium chloride, 10 mL, Intravenous, Q12H  traZODone, 50 mg, Oral, Nightly      Continuous Infusions:   PRN Meds:.•  acetaminophen **OR** acetaminophen **OR** acetaminophen  •  senna-docusate sodium **AND** polyethylene glycol **AND** bisacodyl **AND** bisacodyl  •  meclizine  •  ondansetron **OR** ondansetron  •  sodium chloride  •  sodium chloride  •  sodium chloride  •  traMADol    Assessment & Plan   Assessment & Plan     Active Hospital Problems    Diagnosis  POA   • Pre-diabetes [R73.03]  Yes   • RLS (restless legs syndrome) [G25.81]  Yes   • Vertigo [R42]  Yes   • Hypothyroidism (acquired) [E03.9]  Yes   • HTN (hypertension) [I10]  Yes   • CKD (chronic kidney disease) stage 3, GFR 30-59 ml/min ([facility]) [N18.30]  Yes   • NICM (nonischemic cardiomyopathy) ([facility]) [I42.8]  Yes      Resolved Hospital Problems   No resolved problems to display.        Brief Hospital Course to date:  Ila Galaviz is a 87 y.o. female with history of NICM, SSS s/p PPM, and peripheral neuropathy who presented with dizziness and lower extremity weakness, acute on chronic.    1) Dizziness - improved  -orthostats negative for drop on 9/30 with persistent symptoms: not clearly vertiginous, not orthostatic symptoms that come/go  -CT head and CTA head/neck non-revealing for acute stroke, MRI unable to obtain. Considered less likely posterior stroke by neurology team and I agree given episodic symptoms  -ECHO without significant valvular dz  -concern for medication-induced etiology: patient on ropinirole + pramipexole both w dizziness as significant s/e: decreased ropinirole to qhs dosing 10/1  -continue PT/OT    2) LE Weakness   - this appears slowly progressive and chronic, possibly 2/2 debility and failure to thrive  - CK wnl, TSH wnl, stroke w/u as above  - continue PT    3) Pre-DM - A1c=6.2, appears new diagnosis on review    4) Anxiety - start  lexapro 5 mg daily. Discussed s/e profile in detail with daughter this date by phone    CHFpEF 2/2 ischemic cardiomyopathy - currently appears euvolemic. On home lasix q48 hours, restart as needed  HTN - will slowly restart home BP medications, amlodipine start 10/1. On home losartan  CKDIIIb - at baseline  Hypothyroid - TSH wnl, continue home supplementation  SSS s/p PPM - pacemaker dependent, QTc prolonged but in this setting not predictive of fatal arrhythmia given paced  RLS - will titrate meds as above in #1    Expected Discharge Location and Transportation: rehab  Expected Discharge Date: 10/3 (Discharge date is tentative pending patient's medical condition and is subject to change)      DVT prophylaxis:  Medical and mechanical DVT prophylaxis orders are present.     AM-PAC 6 Clicks Score (PT): 17 (09/29/22 0800)    CODE STATUS:   Code Status and Medical Interventions:   Ordered at: 09/27/22 1454     Level Of Support Discussed With:    Patient     Code Status (Patient has no pulse and is not breathing):    CPR (Attempt to Resuscitate)     Medical Interventions (Patient has pulse or is breathing):    Full Support       Valeria Gorman MD  10/01/22

## 2022-10-01 NOTE — CASE MANAGEMENT/SOCIAL WORK
Discharge Planning Assessment  [facility]     Patient Name: Ila Galaviz  MRN: [id number]  Today's Date: 10/1/2022    Admit Date: 9/27/2022    Plan: [facility] pending insurance   Discharge Needs Assessment    No documentation.                Discharge Plan     Row Name 10/01/22 1244       Plan    Plan [facility] pending insurance    Patient/Family in Agreement with Plan yes    Plan Comments Call received from w/e Kourtney mendenhall with [facility]. A P2P has been requested for denial of sub-acute. P2P must be done by 10/3 @ 1200. The insurance folks are available on the weekend until 5pm. The contact number is [phone], option 5. P2P information entered in Epic under physician advisor tab, email sent to Dr Moran, and notified via phone call.    Final Discharge Disposition Code 30 - still a patient              Continued Care and Services - Admitted Since 9/27/2022     Destination     Service Provider Request Status Selected Services Address Phone Fax Patient Preferred    [facility]  Pending - No Request Sent N/A [address] [address] [phone] --          Home Medical Care     Service Provider Request Status Selected Services Address Phone Fax Patient Preferred    [facility]  Pending - No Request Sent N/A [id number] [id number] [phone] --              Expected Discharge Date and Time     Expected Discharge Date Expected Discharge Time    Oct 3, 2022          Demographic Summary    No documentation.                Functional Status    No documentation.                Psychosocial    No documentation.                Abuse/Neglect    No documentation.                Legal    No documentation.                Substance Abuse    No documentation.                Patient Forms    No documentation.                   Diane Salinas, RN

## 2022-10-02 LAB
B PARAPERT DNA SPEC QL NAA+PROBE: NOT DETECTED
B PERT DNA SPEC QL NAA+PROBE: NOT DETECTED
C PNEUM DNA NPH QL NAA+NON-PROBE: NOT DETECTED
FLUAV SUBTYP SPEC NAA+PROBE: NOT DETECTED
FLUBV RNA ISLT QL NAA+PROBE: NOT DETECTED
HADV DNA SPEC NAA+PROBE: NOT DETECTED
HCOV 229E RNA SPEC QL NAA+PROBE: NOT DETECTED
HCOV HKU1 RNA SPEC QL NAA+PROBE: NOT DETECTED
HCOV NL63 RNA SPEC QL NAA+PROBE: NOT DETECTED
HCOV OC43 RNA SPEC QL NAA+PROBE: NOT DETECTED
HMPV RNA NPH QL NAA+NON-PROBE: NOT DETECTED
HPIV1 RNA ISLT QL NAA+PROBE: NOT DETECTED
HPIV2 RNA SPEC QL NAA+PROBE: NOT DETECTED
HPIV3 RNA NPH QL NAA+PROBE: NOT DETECTED
HPIV4 P GENE NPH QL NAA+PROBE: NOT DETECTED
M PNEUMO IGG SER IA-ACNC: NOT DETECTED
RHINOVIRUS RNA SPEC NAA+PROBE: DETECTED
RSV RNA NPH QL NAA+NON-PROBE: NOT DETECTED
SARS-COV-2 RNA NPH QL NAA+NON-PROBE: NOT DETECTED

## 2022-10-02 PROCEDURE — 96372 THER/PROPH/DIAG INJ SC/IM: CPT

## 2022-10-02 PROCEDURE — 0202U NFCT DS 22 TRGT SARS-COV-2: CPT | Performed by: PHYSICIAN ASSISTANT

## 2022-10-02 PROCEDURE — 25010000002 HEPARIN (PORCINE) PER 1000 UNITS: Performed by: INTERNAL MEDICINE

## 2022-10-02 PROCEDURE — 99225 PR SBSQ OBSERVATION CARE/DAY 25 MINUTES: CPT | Performed by: PHYSICIAN ASSISTANT

## 2022-10-02 PROCEDURE — G0378 HOSPITAL OBSERVATION PER HR: HCPCS

## 2022-10-02 RX ORDER — AMOXICILLIN 250 MG
2 CAPSULE ORAL 2 TIMES DAILY PRN
Status: DISCONTINUED | OUTPATIENT
Start: 2022-10-02 | End: 2022-10-03 | Stop reason: HOSPADM

## 2022-10-02 RX ORDER — AMOXICILLIN 250 MG
2 CAPSULE ORAL NIGHTLY
Status: DISCONTINUED | OUTPATIENT
Start: 2022-10-02 | End: 2022-10-03 | Stop reason: HOSPADM

## 2022-10-02 RX ORDER — BISACODYL 5 MG/1
5 TABLET, DELAYED RELEASE ORAL DAILY PRN
Status: DISCONTINUED | OUTPATIENT
Start: 2022-10-02 | End: 2022-10-03 | Stop reason: HOSPADM

## 2022-10-02 RX ORDER — HYDROXYZINE HYDROCHLORIDE 10 MG/1
10 TABLET, FILM COATED ORAL 3 TIMES DAILY PRN
Status: DISCONTINUED | OUTPATIENT
Start: 2022-10-02 | End: 2022-10-03 | Stop reason: HOSPADM

## 2022-10-02 RX ORDER — ATORVASTATIN CALCIUM 10 MG/1
10 TABLET, FILM COATED ORAL NIGHTLY
Status: DISCONTINUED | OUTPATIENT
Start: 2022-10-02 | End: 2022-10-03 | Stop reason: HOSPADM

## 2022-10-02 RX ORDER — POLYETHYLENE GLYCOL 3350 17 G/17G
17 POWDER, FOR SOLUTION ORAL DAILY PRN
Status: DISCONTINUED | OUTPATIENT
Start: 2022-10-02 | End: 2022-10-03 | Stop reason: HOSPADM

## 2022-10-02 RX ORDER — POLYETHYLENE GLYCOL 3350 17 G/17G
17 POWDER, FOR SOLUTION ORAL DAILY
Status: DISCONTINUED | OUTPATIENT
Start: 2022-10-02 | End: 2022-10-03 | Stop reason: HOSPADM

## 2022-10-02 RX ORDER — GUAIFENESIN AND DEXTROMETHORPHAN HYDROBROMIDE 600; 30 MG/1; MG/1
2 TABLET, EXTENDED RELEASE ORAL EVERY 12 HOURS SCHEDULED
Status: DISCONTINUED | OUTPATIENT
Start: 2022-10-02 | End: 2022-10-03 | Stop reason: HOSPADM

## 2022-10-02 RX ORDER — BENZONATATE 100 MG/1
100 CAPSULE ORAL 3 TIMES DAILY PRN
Status: DISCONTINUED | OUTPATIENT
Start: 2022-10-02 | End: 2022-10-03 | Stop reason: HOSPADM

## 2022-10-02 RX ORDER — BISACODYL 10 MG
10 SUPPOSITORY, RECTAL RECTAL DAILY PRN
Status: DISCONTINUED | OUTPATIENT
Start: 2022-10-02 | End: 2022-10-03 | Stop reason: HOSPADM

## 2022-10-02 RX ADMIN — PRAMIPEXOLE DIHYDROCHLORIDE 1 MG: 1 TABLET ORAL at 09:00

## 2022-10-02 RX ADMIN — AMLODIPINE BESYLATE 2.5 MG: 2.5 TABLET ORAL at 09:00

## 2022-10-02 RX ADMIN — Medication 10 ML: at 09:00

## 2022-10-02 RX ADMIN — HYDROXYZINE HYDROCHLORIDE 10 MG: 10 TABLET ORAL at 12:58

## 2022-10-02 RX ADMIN — GUAIFENESIN AND DEXTROMETHORPHAN HYDROBROMIDE 2 TABLET: 600; 30 TABLET, EXTENDED RELEASE ORAL at 20:48

## 2022-10-02 RX ADMIN — LEVOTHYROXINE SODIUM 112 MCG: 0.11 TABLET ORAL at 09:00

## 2022-10-02 RX ADMIN — MONTELUKAST 10 MG: 10 TABLET, FILM COATED ORAL at 20:49

## 2022-10-02 RX ADMIN — HEPARIN SODIUM 5000 UNITS: 5000 INJECTION INTRAVENOUS; SUBCUTANEOUS at 14:29

## 2022-10-02 RX ADMIN — Medication 250 MG: at 09:00

## 2022-10-02 RX ADMIN — HEPARIN SODIUM 5000 UNITS: 5000 INJECTION INTRAVENOUS; SUBCUTANEOUS at 05:43

## 2022-10-02 RX ADMIN — TRAZODONE HYDROCHLORIDE 50 MG: 50 TABLET ORAL at 20:49

## 2022-10-02 RX ADMIN — GUAIFENESIN AND DEXTROMETHORPHAN HYDROBROMIDE 2 TABLET: 600; 30 TABLET, EXTENDED RELEASE ORAL at 12:47

## 2022-10-02 RX ADMIN — HEPARIN SODIUM 5000 UNITS: 5000 INJECTION INTRAVENOUS; SUBCUTANEOUS at 21:08

## 2022-10-02 RX ADMIN — BENZONATATE 100 MG: 100 CAPSULE ORAL at 02:57

## 2022-10-02 RX ADMIN — ROPINIROLE HYDROCHLORIDE 1 MG: 1 TABLET, FILM COATED ORAL at 20:48

## 2022-10-02 RX ADMIN — ACETAMINOPHEN 650 MG: 325 TABLET, FILM COATED ORAL at 12:50

## 2022-10-02 RX ADMIN — LOSARTAN POTASSIUM 50 MG: 50 TABLET, FILM COATED ORAL at 09:00

## 2022-10-02 RX ADMIN — ATORVASTATIN CALCIUM 10 MG: 10 TABLET, FILM COATED ORAL at 20:49

## 2022-10-02 RX ADMIN — Medication 10 ML: at 20:49

## 2022-10-02 RX ADMIN — ACETAMINOPHEN 650 MG: 325 TABLET, FILM COATED ORAL at 09:00

## 2022-10-02 RX ADMIN — ESCITALOPRAM OXALATE 5 MG: 10 TABLET ORAL at 09:00

## 2022-10-02 RX ADMIN — POLYETHYLENE GLYCOL 3350 17 G: 17 POWDER, FOR SOLUTION ORAL at 12:47

## 2022-10-02 NOTE — PROGRESS NOTES
[facility] Medicine Services  PROGRESS NOTE    Patient Name: Ila Galaviz  : 1935  MRN: [id number]    Date of Admission: 2022  Primary Care Physician: Erin Rubio APRN    Subjective     CC: f/u dizziness     HPI:  Sitting up in bed. Was able to have a bowel movement last night. Says she got up this morning to go to the bathroom and did not feel dizzy. She complains of sore throat and cough. Ribs hurt from coughing all night. Daughter was sick with similar symptoms last week. Daughter tested negative for COVID.     ROS:  Gen- No fevers, chills. (+) sore throat  CV- No chest pain, palpitations  Resp- (+) cough, no dyspnea  GI- No N/V/D, abd pain    Objective     Vital Signs:   Temp:  [97.6 °F (36.4 °C)-98.2 °F (36.8 °C)] 97.7 °F (36.5 °C)  Heart Rate:  [] 82  Resp:  [16-18] 18  BP: (128-185)/(67-89) 150/77  Flow (L/min):  [1] 1     Physical Exam:  Constitutional: No acute distress, awake, alert and conversant. Sitting upright in bed   HENT: NCAT, mucous membranes moist  Respiratory: Clear to auscultation bilaterally, respiratory effort normal on room air   Cardiovascular: RRR, no murmurs, rubs, or gallops  Gastrointestinal: Positive bowel sounds, soft, nontender, nondistended  Musculoskeletal: No bilateral ankle edema  Psychiatric: Anxious affect, cooperative  Neurologic: Oriented x 3, moves all extremities spontaneously without focal deficits, speech clear and coherent   Skin: No rashes to exposed surfaces     Results Reviewed:  LAB RESULTS:      Lab 22  0456 22  0959   WBC 8.60 8.91   HEMOGLOBIN 12.7 14.7   HEMATOCRIT 38.4 44.4   PLATELETS 253 278   NEUTROS ABS  --  6.19   IMMATURE GRANS (ABS)  --  0.02   LYMPHS ABS  --  2.03   MONOS ABS  --  0.43   EOS ABS  --  0.20   MCV 86.1 86.0         Lab 10/01/22  0503 22  0456 22  1015 22  0959   SODIUM 139 141  --  142   POTASSIUM 4.8 4.0  --  4.3   CHLORIDE 105 106  --  105   CO2 24.0 23.0   --  25.0   ANION GAP 10.0 12.0  --  12.0   BUN 32* 35*  --  39*   CREATININE 1.66* 1.61* 1.70* 1.39*   EGFR 30.1* 31.2*  --  37.3*   GLUCOSE 95 94  --  107*   CALCIUM 9.0 8.5*  --  9.5   MAGNESIUM  --   --   --  1.9   HEMOGLOBIN A1C  --  6.20*  --   --    TSH  --  2.070  --   --          Lab 09/27/22  0959   TOTAL PROTEIN 7.8   ALBUMIN 4.40   GLOBULIN 3.4   ALT (SGPT) 12   AST (SGOT) 14   BILIRUBIN 0.7   ALK PHOS 73         Lab 09/27/22  0959   TROPONIN T <0.010         Lab 09/28/22  0456   CHOLESTEROL 188   LDL CHOL 122*   HDL CHOL 38*   TRIGLYCERIDES 158*     Brief Urine Lab Results  (Last result in the past 365 days)      Color   Clarity   Blood   Leuk Est   Nitrite   Protein   CREAT   Urine HCG        09/27/22 1019 Yellow   Clear   Negative   Negative   Negative   30 mg/dL (1+)               Microbiology Results Abnormal     None        No radiology results from the last 24 hrs    Results for orders placed during the hospital encounter of 09/27/22    Adult Transthoracic Echo Complete W/ Cont if Necessary Per Protocol (With Agitated Saline)    Interpretation Summary  · Left ventricular ejection fraction appears to be 51 - 55%.  · Left ventricular wall thickness is consistent with mild concentric hypertrophy.  · Left atrial volume is moderately increased.  · Mild mitral valve stenosis is present. The mitral valve peak gradient is 11.2 mmHg. The mitral valve mean gradient is 5 mmHg. The mitral valve area (PHT) is 2.5 cm2.  · Left ventricular diastolic function is consistent with (grade I) impaired relaxation.  · Mild to moderate aortic valve regurgitation is present  · Electronic lead present in the right ventricle    I have reviewed the medications:  Scheduled Meds:amLODIPine, 2.5 mg, Oral, Q24H  atorvastatin, 40 mg, Oral, Nightly  escitalopram, 5 mg, Oral, Daily  guaifenesin-dextromethorphan, 2 tablet, Oral, Q12H  heparin (porcine), 5,000 Units, Subcutaneous, Q8H  levothyroxine, 112 mcg, Oral, Daily  losartan, 50  mg, Oral, Q24H  montelukast, 10 mg, Oral, Nightly  polyethylene glycol, 17 g, Oral, Daily  pramipexole, 1 mg, Oral, Daily  rOPINIRole, 1 mg, Oral, Nightly  saccharomyces boulardii, 250 mg, Oral, Daily  sodium chloride, 10 mL, Intravenous, Q12H  sodium chloride, 10 mL, Intravenous, Q12H  traZODone, 50 mg, Oral, Nightly      Continuous Infusions:   PRN Meds:.•  acetaminophen **OR** acetaminophen **OR** acetaminophen  •  benzonatate  •  senna-docusate sodium **AND** polyethylene glycol **AND** bisacodyl **AND** bisacodyl  •  meclizine  •  ondansetron **OR** ondansetron  •  sodium chloride  •  sodium chloride  •  sodium chloride  •  traMADol    Assessment & Plan     Active Hospital Problems    Diagnosis  POA   • Pre-diabetes [R73.03]  Yes   • RLS (restless legs syndrome) [G25.81]  Yes   • Vertigo [R42]  Yes   • Hypothyroidism (acquired) [E03.9]  Yes   • HTN (hypertension) [I10]  Yes   • CKD (chronic kidney disease) stage 3, GFR 30-59 ml/min ([facility]) [N18.30]  Yes   • NICM (nonischemic cardiomyopathy) ([facility]) [I42.8]  Yes      Resolved Hospital Problems   No resolved problems to display.     Brief Hospital Course to date:  Ila Galaviz is an 87 y.o. female with PMH significant for HTN, non-ischemic cardiomyopathy, SSS s/p PPM, CKD III, hypothyroidism, RLS and prediabetes. She was admitted to [facility] on 9/27/22 for evaluation of dizziness and lower extremity weakness. She does note chronic RLE weakness (etiology unknown).     Dizziness, improved  - No orthostatic hypotension  - Not clearly vertiginous  - CT head and CTA head/neck were non-revealing for acute stroke. Unable to obtain MRI due to PPM. Stroke neurology team feels posterior stroke is unlikely, particularly in light of episodic symptoms  - ECHO without significant valvular disease  - Some concern for medication-induced etiology. Patient on Ropinerole and Pramipexole- both have dizziness as a significant side effect. My partner decreased Ropinerole  to QHS dosing on 10/1  - PT/OT following - recommend rehab. Insurance is requesting a peer to peer    Lower extremity weakness   - CK WNL. Unremarkable stroke work up as above   - This appears slowly progressive and chronic, possibly 2/2 debility and failure to thrive  - Continue PT/OT    Prediabetes  - Hgb A1c 6.2%  - New diagnosis this admission. Would favor monitoring only at this time, given her age and comorbidities. PCP can recheck in 6 months     Anxiety  - Started on Lexapro 5mg daily this admission  - My partner discussed s/e profile in detail with daughter over phone on 10/1  - Add Hydroxyzine 10mg TID PRN for anxiety / sleep     Chronic diastolic CHF   Nonischemic cardiomyopathy  SSS s/p PPM  - Per review of records, history of systolic CHF with EF 25-30% per [facility] in 2018  - Follows with Kesha LOREDO at [facility]  - Holding home Lasix 20mg every other day for now    Hypertension  - Continue Losartan 50mg daily, Amlodipine 2.5mg PO daily     CKD IIIb  - Creatinine stable/at baseline  - Encourage PO fluid intake  - AM BMP     RLS, continue Mirapex QAM, Requip QHS   Hypothyroidism, TSH WNL Continue Synthroid     Cough / chest congestion  - Check respiratory viral PCR  - Add Mucinex DM BID - encourage PO fluids      Expected Discharge Location and Transportation: [facility] via private vehicle or medical transport van   Expected Discharge Date: 10/3/22     DVT prophylaxis:Medical and mechanical DVT prophylaxis orders are present.     AM-PAC 6 Clicks Score (PT): 17 (09/29/22 0800)    CODE STATUS:   Code Status and Medical Interventions:   Ordered at: 09/27/22 8154     Level Of Support Discussed With:    Patient     Code Status (Patient has no pulse and is not breathing):    CPR (Attempt to Resuscitate)     Medical Interventions (Patient has pulse or is breathing):    Full Support     Jovanna Vaca PA-C  10/02/22

## 2022-10-02 NOTE — CASE MANAGEMENT/SOCIAL WORK
Discharge Planning Assessment  [facility]     Patient Name: Ila Galaviz  MRN: [id number]  Today's Date: 10/2/2022    Admit Date: 9/27/2022    Plan: Home with [facility]   Discharge Needs Assessment    No documentation.                Discharge Plan     Row Name 10/02/22 1109       Plan    Plan Home with [facility]    Plan Comments Received a secure chat from Dr Moran regarding the P2P. Per Dr Moran's review, a P2P will not be done. She doesn't meet SNF level of care and is too close to being back to baseline. She has rec home with  or outpatient services. Per provider note, likely could DC on Monday. Met & spoke with Mrs Galaviz at the bedside and she is agreeable to the plan.              Continued Care and Services - Admitted Since 9/27/2022     Destination     Service Provider Request Status Selected Services Address Phone Fax Patient Preferred    [facility]  Declined  Insurance Denial N/A [address] [phone] --          Home Medical Care Coordination complete.    Service Provider Request Status Selected Services Address Phone Fax Patient Preferred    [facility] [address] [facility] [facility] [address] [phone] --              Expected Discharge Date and Time     Expected Discharge Date Expected Discharge Time    Oct 3, 2022          Demographic Summary    No documentation.                Functional Status    No documentation.                Psychosocial    No documentation.                Abuse/Neglect    No documentation.                Legal    No documentation.                Substance Abuse    No documentation.                Patient Forms    No documentation.                   Diane Salinas RN

## 2022-10-03 ENCOUNTER — TELEPHONE (OUTPATIENT)
Dept: NEUROLOGY | Facility: CLINIC | Age: 87
End: 2022-10-03

## 2022-10-03 ENCOUNTER — READMISSION MANAGEMENT (OUTPATIENT)
Dept: CALL CENTER | Facility: HOSPITAL | Age: 87
End: 2022-10-03

## 2022-10-03 VITALS
OXYGEN SATURATION: 95 % | WEIGHT: 139.99 LBS | DIASTOLIC BLOOD PRESSURE: 78 MMHG | SYSTOLIC BLOOD PRESSURE: 135 MMHG | BODY MASS INDEX: 23.9 KG/M2 | HEART RATE: 76 BPM | TEMPERATURE: 97.4 F | RESPIRATION RATE: 16 BRPM | HEIGHT: 64 IN

## 2022-10-03 LAB
ANION GAP SERPL CALCULATED.3IONS-SCNC: 10 MMOL/L (ref 5–15)
BUN SERPL-MCNC: 39 MG/DL (ref 8–23)
BUN/CREAT SERPL: 24.1 (ref 7–25)
CALCIUM SPEC-SCNC: 8.6 MG/DL (ref 8.6–10.5)
CHLORIDE SERPL-SCNC: 106 MMOL/L (ref 98–107)
CO2 SERPL-SCNC: 24 MMOL/L (ref 22–29)
CREAT SERPL-MCNC: 1.62 MG/DL (ref 0.57–1)
DEPRECATED RDW RBC AUTO: 46.1 FL (ref 37–54)
EGFRCR SERPLBLD CKD-EPI 2021: 31 ML/MIN/1.73
ERYTHROCYTE [DISTWIDTH] IN BLOOD BY AUTOMATED COUNT: 14.2 % (ref 12.3–15.4)
GLUCOSE SERPL-MCNC: 90 MG/DL (ref 65–99)
HCT VFR BLD AUTO: 39 % (ref 34–46.6)
HGB BLD-MCNC: 12.7 G/DL (ref 12–15.9)
MCH RBC QN AUTO: 28.9 PG (ref 26.6–33)
MCHC RBC AUTO-ENTMCNC: 32.6 G/DL (ref 31.5–35.7)
MCV RBC AUTO: 88.8 FL (ref 79–97)
PLATELET # BLD AUTO: 247 10*3/MM3 (ref 140–450)
PMV BLD AUTO: 10.1 FL (ref 6–12)
POTASSIUM SERPL-SCNC: 4.9 MMOL/L (ref 3.5–5.2)
RBC # BLD AUTO: 4.39 10*6/MM3 (ref 3.77–5.28)
SODIUM SERPL-SCNC: 140 MMOL/L (ref 136–145)
WBC NRBC COR # BLD: 7.73 10*3/MM3 (ref 3.4–10.8)

## 2022-10-03 PROCEDURE — 99217 PR OBSERVATION CARE DISCHARGE MANAGEMENT: CPT | Performed by: PHYSICIAN ASSISTANT

## 2022-10-03 PROCEDURE — 80048 BASIC METABOLIC PNL TOTAL CA: CPT | Performed by: PHYSICIAN ASSISTANT

## 2022-10-03 PROCEDURE — 85027 COMPLETE CBC AUTOMATED: CPT

## 2022-10-03 PROCEDURE — G0378 HOSPITAL OBSERVATION PER HR: HCPCS

## 2022-10-03 PROCEDURE — 25010000002 HEPARIN (PORCINE) PER 1000 UNITS: Performed by: INTERNAL MEDICINE

## 2022-10-03 PROCEDURE — 96372 THER/PROPH/DIAG INJ SC/IM: CPT

## 2022-10-03 RX ORDER — BENZONATATE 100 MG/1
100 CAPSULE ORAL 3 TIMES DAILY PRN
Qty: 30 CAPSULE | Refills: 0 | Status: SHIPPED | OUTPATIENT
Start: 2022-10-03

## 2022-10-03 RX ORDER — HYDROXYZINE HYDROCHLORIDE 10 MG/1
10 TABLET, FILM COATED ORAL 3 TIMES DAILY PRN
Qty: 30 TABLET | Refills: 5 | Status: SHIPPED | OUTPATIENT
Start: 2022-10-03

## 2022-10-03 RX ORDER — ROPINIROLE 1 MG/1
1 TABLET, FILM COATED ORAL NIGHTLY
Start: 2022-10-03

## 2022-10-03 RX ORDER — ESCITALOPRAM OXALATE 5 MG/1
5 TABLET ORAL DAILY
Qty: 30 TABLET | Refills: 5 | Status: SHIPPED | OUTPATIENT
Start: 2022-10-04

## 2022-10-03 RX ORDER — GUAIFENESIN AND DEXTROMETHORPHAN HYDROBROMIDE 600; 30 MG/1; MG/1
1-2 TABLET, EXTENDED RELEASE ORAL EVERY 12 HOURS SCHEDULED
Qty: 28 EACH | Refills: 0 | Status: SHIPPED | OUTPATIENT
Start: 2022-10-03

## 2022-10-03 RX ORDER — BUSPIRONE HYDROCHLORIDE 10 MG/1
10 TABLET ORAL 2 TIMES DAILY
Qty: 60 TABLET | Refills: 5 | Status: SHIPPED | OUTPATIENT
Start: 2022-10-03

## 2022-10-03 RX ORDER — IRBESARTAN 150 MG/1
150 TABLET ORAL DAILY
Start: 2022-10-03

## 2022-10-03 RX ADMIN — HEPARIN SODIUM 5000 UNITS: 5000 INJECTION INTRAVENOUS; SUBCUTANEOUS at 05:45

## 2022-10-03 RX ADMIN — LOSARTAN POTASSIUM 50 MG: 50 TABLET, FILM COATED ORAL at 09:33

## 2022-10-03 RX ADMIN — ACETAMINOPHEN 650 MG: 325 TABLET, FILM COATED ORAL at 09:42

## 2022-10-03 RX ADMIN — GUAIFENESIN AND DEXTROMETHORPHAN HYDROBROMIDE 2 TABLET: 600; 30 TABLET, EXTENDED RELEASE ORAL at 09:32

## 2022-10-03 RX ADMIN — Medication 250 MG: at 09:33

## 2022-10-03 RX ADMIN — PRAMIPEXOLE DIHYDROCHLORIDE 1 MG: 1 TABLET ORAL at 09:33

## 2022-10-03 RX ADMIN — Medication 10 ML: at 09:33

## 2022-10-03 RX ADMIN — POLYETHYLENE GLYCOL 3350 17 G: 17 POWDER, FOR SOLUTION ORAL at 09:33

## 2022-10-03 RX ADMIN — LEVOTHYROXINE SODIUM 112 MCG: 0.11 TABLET ORAL at 09:33

## 2022-10-03 RX ADMIN — AMLODIPINE BESYLATE 2.5 MG: 2.5 TABLET ORAL at 09:33

## 2022-10-03 RX ADMIN — ESCITALOPRAM OXALATE 5 MG: 10 TABLET ORAL at 09:33

## 2022-10-03 RX ADMIN — HYDROXYZINE HYDROCHLORIDE 10 MG: 10 TABLET ORAL at 09:32

## 2022-10-03 NOTE — TELEPHONE ENCOUNTER
Caller:  [facility]    Relationship to patient:     Best call back number: N/A    New or established patient?  [x] New  [] Established    Date of discharge:10-    Facility discharged from: [facility]    Diagnosis/Symptoms: VERTIGO    Length of stay (If applicable): 6 DAYS    Specialty Only: Did you see a [facility] health provider?    [x] Yes  [] No  If so, who? DR. KIRK    REQUESTING 8 WEEK FOLLOW UP, SCHEDULED 1ST AVAILABLE IN January, PATIENT IS ON WAIT LIST

## 2022-10-03 NOTE — DISCHARGE SUMMARY
[facility] Hospital Medicine Services  DISCHARGE SUMMARY    Patient Name: Ila Galaviz  : 1935  MRN: [id number]    Date of Admission: 2022  9:38 AM  Date of Discharge: 10/3/2022  Primary Care Physician: Erin Rubio APRN    Consults     Date and Time Order Name Status Description    2022  3:08 PM Inpatient Cardiology Consult Completed     2022  3:06 PM Inpatient Neurology Consult Stroke Completed         Hospital Course     Presenting Problem:   Vertigo [R42]    Active Hospital Problems    Diagnosis  POA   • Pre-diabetes [R73.03]  Yes   • RLS (restless legs syndrome) [G25.81]  Yes   • Vertigo [R42]  Yes   • Hypothyroidism (acquired) [E03.9]  Yes   • HTN (hypertension) [I10]  Yes   • CKD (chronic kidney disease) stage 3, GFR 30-59 ml/min (HCC) [N18.30]  Yes   • NICM (nonischemic cardiomyopathy) (HCC) [I42.8]  Yes      Resolved Hospital Problems   No resolved problems to display.      Hospital Course:  Ila Galaviz is an 87 y.o. female with PMH significant for HTN, non-ischemic cardiomyopathy, SSS s/p PPM, CKD III, hypothyroidism, RLS and prediabetes. She was admitted to [facility] on 22 for evaluation of dizziness and lower extremity weakness. She does note chronic RLE weakness (etiology unknown).      Dizziness, improved  - No orthostatic hypotension  - Not clearly vertiginous  - CT head and CTA head/neck were non-revealing for acute stroke. Unable to obtain MRI due to PPM. Stroke neurology team feels posterior stroke is unlikely, particularly in light of episodic symptoms  - ECHO without significant valvular disease  - Some concern for medication-induced etiology. Patient on Ropinerole and Pramipexole- both have dizziness as a significant side effect. My partner decreased Ropinerole to QHS dosing on 10/1  - PT/OT following - recommend rehab. Insurance denied rehab, after [id number] today, the denial was upheld and she will ultimately DC home with  home health services.      Lower extremity weakness   - CK WNL. Unremarkable stroke work up as above   - This appears slowly progressive and chronic, possibly 2/2 debility and failure to thrive  - Continue PT/OT at home      Prediabetes  - Hgb A1c 6.2%  - New diagnosis this admission.   - Would favor monitoring only at this time, given her age and comorbidities. PCP can recheck in 6 months      Anxiety  - Started on Lexapro 5mg daily this admission  - My partner discussed s/e profile in detail with daughter over phone on 10/1  - Daughter would like something that will help the patient more quickly. She does not like that the Hydroxyzine causes sedation. I am not comfortable starting benzodiazepines. Will add BuSpar 10mg BID, I counseled patient/daughter that dizziness is a known side effect and they should discontinue medication if she develops dizziness  - Will continue Hydroxyzine 10mg TID PRN for anxiety / sleep      Chronic diastolic CHF   Nonischemic cardiomyopathy  SSS s/p PPM  - Per review of records, history of systolic CHF with EF 25-30% per [facility] in 2018  - ECHO this admission showed LVEF 51-55% with mild concentric hypertrophy / grade I diastolic dysfunction and mildly increased LA volume   - Follows with Kesha LOREDO at [facility]  - Per patient, she is not taking Lasix      Hypertension  - Continue Irbesartan 150mg daily, Amlodipine 2.5mg PO daily   - Holding Carvedilol 6.25mg PO BID      CKD IIIb  - Creatinine stable/at baseline    RLS, continue Mirapex QAM, Requip QHS   Hypothyroidism, TSH WNL Continue Synthroid      Cough / chest congestion  - Respiratory viral PCR positive for rhinovirus  - Continue Mucinex DM BID and PRN Tessalon      Day of Discharge     HPI:   Sitting up. Says she had no dizziness yesterday and none so far today. Cough and congestion persist but overall, improved from yesterday. We discussed results of respiratory PCR. She is disappointed that insurance has denied her for  rehab. Discussed discharge plan of care in detail with daughter at bedside.     Review of Systems  Gen- No fevers, chills  CV- No chest pain, palpitations  Resp- (+) cough, no dyspnea  GI- No N/V/D, abd pain    Vital Signs:   Temp:  [97.4 °F (36.3 °C)-97.8 °F (36.6 °C)] 97.4 °F (36.3 °C)  Heart Rate:  [71-88] 76  Resp:  [16-18] 16  BP: (119-198)/(60-90) 198/90  Flow (L/min):  [1] 1    Physical Exam:  Constitutional: No acute distress, awake, alert and conversant  HENT: NCAT, mucous membranes moist  Respiratory: Clear to auscultation bilaterally, respiratory effort normal on room air   Cardiovascular: RRR, no murmurs, rubs, or gallops  Gastrointestinal: Positive bowel sounds, soft, nontender, nondistended  Musculoskeletal: No bilateral ankle edema  Psychiatric: Appropriate affect, cooperative  Neurologic: Oriented x 3, moves all extremities spontaneously, globally weak, speech clear    Pertinent  and/or Most Recent Results     LAB RESULTS:      Lab 10/03/22  0512 09/28/22  0456 09/27/22  0959   WBC 7.73 8.60 8.91   HEMOGLOBIN 12.7 12.7 14.7   HEMATOCRIT 39.0 38.4 44.4   PLATELETS 247 253 278   NEUTROS ABS  --   --  6.19   IMMATURE GRANS (ABS)  --   --  0.02   LYMPHS ABS  --   --  2.03   MONOS ABS  --   --  0.43   EOS ABS  --   --  0.20   MCV 88.8 86.1 86.0         Lab 10/03/22  0512 10/01/22  0503 09/28/22  0456 09/27/22  1015 09/27/22  0959   SODIUM 140 139 141  --  142   POTASSIUM 4.9 4.8 4.0  --  4.3   CHLORIDE 106 105 106  --  105   CO2 24.0 24.0 23.0  --  25.0   ANION GAP 10.0 10.0 12.0  --  12.0   BUN 39* 32* 35*  --  39*   CREATININE 1.62* 1.66* 1.61* 1.70* 1.39*   EGFR 31.0* 30.1* 31.2*  --  37.3*   GLUCOSE 90 95 94  --  107*   CALCIUM 8.6 9.0 8.5*  --  9.5   MAGNESIUM  --   --   --   --  1.9   HEMOGLOBIN A1C  --   --  6.20*  --   --    TSH  --   --  2.070  --   --          Lab 09/27/22  0959   TOTAL PROTEIN 7.8   ALBUMIN 4.40   GLOBULIN 3.4   ALT (SGPT) 12   AST (SGOT) 14   BILIRUBIN 0.7   ALK PHOS 73          Lab 09/27/22  0959   TROPONIN T <0.010         Lab 09/28/22  0456   CHOLESTEROL 188   LDL CHOL 122*   HDL CHOL 38*   TRIGLYCERIDES 158*     Brief Urine Lab Results  (Last result in the past 365 days)      Color   Clarity   Blood   Leuk Est   Nitrite   Protein   CREAT   Urine HCG        09/27/22 1019 Yellow   Clear   Negative   Negative   Negative   30 mg/dL (1+)               Microbiology Results (last 10 days)     Procedure Component Value - Date/Time    Respiratory Panel PCR w/COVID-19(SARS-CoV-2) DAVY/FE/MARILYN/PAD/COR/MAD/KAELA In-House, NP Swab in UTM/VTM, 3-4 HR TAT - Swab, Nasopharynx [[id number]]  (Abnormal) Collected: 10/02/22 1411    Lab Status: Final result Specimen: Swab from Nasopharynx Updated: 10/02/22 1807     ADENOVIRUS, PCR Not Detected     Coronavirus 229E Not Detected     Coronavirus HKU1 Not Detected     Coronavirus NL63 Not Detected     Coronavirus OC43 Not Detected     COVID19 Not Detected     Human Metapneumovirus Not Detected     Human Rhinovirus/Enterovirus Detected     Influenza A PCR Not Detected     Influenza B PCR Not Detected     Parainfluenza Virus 1 Not Detected     Parainfluenza Virus 2 Not Detected     Parainfluenza Virus 3 Not Detected     Parainfluenza Virus 4 Not Detected     RSV, PCR Not Detected     Bordetella pertussis pcr Not Detected     Bordetella parapertussis PCR Not Detected     Chlamydophila pneumoniae PCR Not Detected     Mycoplasma pneumo by PCR Not Detected    Narrative:      In the setting of a positive respiratory panel with a viral infection PLUS a negative procalcitonin without other underlying concern for bacterial infection, consider observing off antibiotics or discontinuation of antibiotics and continue supportive care. If the respiratory panel is positive for atypical bacterial infection (Bordetella pertussis, Chlamydophila pneumoniae, or Mycoplasma pneumoniae), consider antibiotic de-escalation to target atypical bacterial infection.        Adult Transthoracic  Echo Complete W/ Cont if Necessary Per Protocol (With Agitated Saline)    Result Date: 9/28/2022  · Left ventricular ejection fraction appears to be 51 - 55%. · Left ventricular wall thickness is consistent with mild concentric hypertrophy. · Left atrial volume is moderately increased. · Mild mitral valve stenosis is present. The mitral valve peak gradient is 11.2 mmHg. The mitral valve mean gradient is 5 mmHg. The mitral valve area (PHT) is 2.5 cm2. · Left ventricular diastolic function is consistent with (grade I) impaired relaxation. · Mild to moderate aortic valve regurgitation is present · Electronic lead present in the right ventricle      CT Head Without Contrast    Result Date: 9/28/2022  Examination: CT HEAD WO CONTRAST-  Date of Exam: 9/28/2022 5:51 PM  Indication: follow-up stroke; R42-Dizziness and giddiness; R53.1-Weakness; R26.2-Difficulty in walking, not elsewhere classified.  Comparison: CT angiogram head 9/27/2022.  Technique: Axial noncontrast CT imaging of the head was performed. Automated exposure control and iterative reconstruction methods were used.  Findings: Superficial soft tissues appear within normal limits. The calvarium is intact.  Paranasal sinuses and mastoid air cells appear well aerated. Orbits are unremarkable.  There is no acute intracranial hemorrhage.  No mass effect or midline shift.  No abnormal extra-axial collections. Gray-white differentiation is within normal limits.  There is patchy white matter hypoattenuation. There are no new focal areas of hypoattenuation in the brain. There is mild intracranial vascular calcification. Ventricular size and configuration is normal for age.       1. No acute intracranial abnormality. 2. Mild chronic small vessel ischemic change.  This report was finalized on 9/28/2022 6:26 PM by Jack Varghese MD.      CT Head Without Contrast    Result Date: 9/27/2022  CT HEAD WO CONTRAST-  Date of Exam: 9/27/2022 11:14 AM  Indication: Weak and dizzy.   Comparison Exams: None available.  Technique: Multiple axial images were obtained from the skull base to the vertex without the administration of IV contrast. The axial data was used to generate reformatted images in the coronal and sagittal planes. Automated exposure control and iterative reconstruction methods were used.  FINDINGS: There is mild generalized parenchymal volume loss.  There is some minimal patchy low-attenuation within the periventricular white matter bilaterally compatible changes of chronic small vessel ischemic disease.   There is no acute infarct or hemorrhage.   No abnormal extra-axial fluid collections are seen.   There is no mass effect or hydrocephalus.  There is no evidence of skull fracture.   Visualized paranasal sinuses and mastoid air cells are clear.  The globes and orbits are within normal limits.        1.  Mild generalized parenchymal volume loss and changes of chronic small vessel ischemic disease.  No acute intracranial abnormality identified.  This report was finalized on 9/27/2022 12:46 PM by Manas Duran MD.      CT Angiogram Neck    Result Date: 9/27/2022  DATE OF EXAM: 9/27/2022 11:14 AM  PROCEDURE: CT ANGIOGRAM NECK-, CT ANGIOGRAM HEAD-  INDICATIONS: Weak and dizzy  COMPARISON: No comparisons available.  TECHNIQUE: CTA of the head and CTA of the neck was performed after the intravenous administration of Isovue 370. Reconstructed coronal and sagittal images were also obtained. In addition, a 3-D volume rendered image was obtained after post processing. Automated exposure control and iterative reconstruction methods were used. AI analysis of LVO was utilized for the CTA Head imaging portion of the study.  The radiation dose reduction device was turned on for each scan per the ALARA (As Low as Reasonably Achievable) protocol.  Stenosis measurement was performed by the NASCET or similar method.  FINDINGS: CTA neck: Aortic arch is not included within the field-of-view.  Visualized portions of the proximal great vessels are patent.  Right brachiocephalic artery is patent.  Right subclavian artery is patent. Right common carotid, internal carotid external carotid arteries are patent without focal stenosis.  Visualized left common carotid artery is patent without focal stenosis.  Left internal or external carotid arteries are also patent without focal stenosis.  Left subclavian artery is patent.  The vertebral arteries are codominant and patent bilaterally without focal stenosis.  Patient status post thyroidectomy.  There is no cervical lymphadenopathy.  Visualized lung apices are clear. Degenerative changes are noted throughout the cervical spine.  CTA head: Intracranial portions the bilateral internal carotid arteries are patent without significant stenosis.  Bilateral anterior cerebral and middle cerebral arteries are patent without focal stenosis.  There is no evidence of intracranial aneurysm.  Vertebral arteries are patent skull base.  Basilar artery is patent without focal stenosis.  Bilateral superior cerebellar and posterior cerebral arteries are patent without focal stenosis.  No posterior circulation aneurysm identified.  There is no pathologic intracranial contrast enhancement.  There is mild generalized parenchymal volume loss.  Globes and orbits are within normal limits.  Paranasal sinuses and mastoid air cells are clear.       1.  No evidence of carotid or vertebral artery stenosis. 2.  No intracranial vascular stenosis, occlusion, or aneurysm. 3.  No pathologic contrast enhancement.  This report was finalized on 9/27/2022 12:51 PM by Manas Duran MD.      XR Chest 1 View    Result Date: 9/27/2022  DATE OF EXAM: 9/27/2022 10:11 AM  PROCEDURE: XR CHEST 1 VW-  INDICATIONS: Weakness, dizziness, altered mental status.  COMPARISON: 06/23/2022.  TECHNIQUE: Single radiographic view of the chest was obtained.  FINDINGS: The heart size is normal. There is a left-sided  transvenous pacemaker in place. The pulmonary vascular markings are normal. There is bilateral basilar subsegmental atelectasis. There is no airspace disease, pleural effusion, or pneumothorax.  There are mild chronic age-related changes involving the bony thorax and thoracic aorta.       1. Mild bilateral basilar subsegmental atelectasis. 2. Otherwise no active disease.  This report was finalized on 9/27/2022 10:36 AM by Amarjit Obrien MD.      CT Angiogram Head    Result Date: 9/27/2022  DATE OF EXAM: 9/27/2022 11:14 AM  PROCEDURE: CT ANGIOGRAM NECK-, CT ANGIOGRAM HEAD-  INDICATIONS: Weak and dizzy  COMPARISON: No comparisons available.  TECHNIQUE: CTA of the head and CTA of the neck was performed after the intravenous administration of Isovue 370. Reconstructed coronal and sagittal images were also obtained. In addition, a 3-D volume rendered image was obtained after post processing. Automated exposure control and iterative reconstruction methods were used. AI analysis of LVO was utilized for the CTA Head imaging portion of the study.  The radiation dose reduction device was turned on for each scan per the ALARA (As Low as Reasonably Achievable) protocol.  Stenosis measurement was performed by the NASCET or similar method.  FINDINGS: CTA neck: Aortic arch is not included within the field-of-view. Visualized portions of the proximal great vessels are patent.  Right brachiocephalic artery is patent.  Right subclavian artery is patent. Right common carotid, internal carotid external carotid arteries are patent without focal stenosis.  Visualized left common carotid artery is patent without focal stenosis.  Left internal or external carotid arteries are also patent without focal stenosis.  Left subclavian artery is patent.  The vertebral arteries are codominant and patent bilaterally without focal stenosis.  Patient status post thyroidectomy.  There is no cervical lymphadenopathy.  Visualized lung apices are clear.  Degenerative changes are noted throughout the cervical spine.  CTA head: Intracranial portions the bilateral internal carotid arteries are patent without significant stenosis.  Bilateral anterior cerebral and middle cerebral arteries are patent without focal stenosis.  There is no evidence of intracranial aneurysm.  Vertebral arteries are patent skull base.  Basilar artery is patent without focal stenosis.  Bilateral superior cerebellar and posterior cerebral arteries are patent without focal stenosis.  No posterior circulation aneurysm identified.  There is no pathologic intracranial contrast enhancement.  There is mild generalized parenchymal volume loss.  Globes and orbits are within normal limits.  Paranasal sinuses and mastoid air cells are clear.       1.  No evidence of carotid or vertebral artery stenosis. 2.  No intracranial vascular stenosis, occlusion, or aneurysm. 3.  No pathologic contrast enhancement.  This report was finalized on 9/27/2022 12:51 PM by Manas Duran MD.      Results for orders placed during the hospital encounter of 09/27/22    Adult Transthoracic Echo Complete W/ Cont if Necessary Per Protocol (With Agitated Saline)    Interpretation Summary  · Left ventricular ejection fraction appears to be 51 - 55%.  · Left ventricular wall thickness is consistent with mild concentric hypertrophy.  · Left atrial volume is moderately increased.  · Mild mitral valve stenosis is present. The mitral valve peak gradient is 11.2 mmHg. The mitral valve mean gradient is 5 mmHg. The mitral valve area (PHT) is 2.5 cm2.  · Left ventricular diastolic function is consistent with (grade I) impaired relaxation.  · Mild to moderate aortic valve regurgitation is present  · Electronic lead present in the right ventricle    Discharge Details        Discharge Medications      New Medications      Instructions Start Date   benzonatate 100 MG capsule  Commonly known as: TESSALON   100 mg, Oral, 3 Times Daily PRN       busPIRone 10 MG tablet  Commonly known as: BUSPAR   10 mg, Oral, 2 Times Daily      escitalopram 5 MG tablet  Commonly known as: LEXAPRO   5 mg, Oral, Daily   Start Date: October 4, 2022     guaifenesin-dextromethorphan  MG tablet sustained-release 12 hour tablet   1-2 tablets, Oral, Every 12 Hours Scheduled      hydrOXYzine 10 MG tablet  Commonly known as: ATARAX   10 mg, Oral, 3 Times Daily PRN         Changes to Medications      Instructions Start Date   irbesartan 150 MG tablet  Commonly known as: AVAPRO  What changed:   · medication strength  · how much to take  · when to take this   150 mg, Oral, Daily      multivitamin with minerals tablet tablet  What changed: Another medication with the same name was removed. Continue taking this medication, and follow the directions you see here.   Oral      rOPINIRole 1 MG tablet  Commonly known as: REQUIP  What changed: when to take this   1 mg, Oral, Nightly, Take 1 hour before bedtime.         Continue These Medications      Instructions Start Date   acetaminophen 500 MG tablet  Commonly known as: TYLENOL   500 mg, Oral, Every 6 Hours PRN      amLODIPine 2.5 MG tablet  Commonly known as: NORVASC   amlodipine 2.5 mg tablet   Take 1 tablet every day by oral route in the morning for 30 days.      ipratropium 0.03 % nasal spray  Commonly known as: ATROVENT   Every 12 Hours Scheduled      levothyroxine 112 MCG tablet  Commonly known as: SYNTHROID, LEVOTHROID   112 mcg, Oral, Daily      ondansetron ODT 4 MG disintegrating tablet  Commonly known as: ZOFRAN-ODT   4 mg, Translingual, 4 Times Daily PRN      pramipexole 0.5 MG tablet  Commonly known as: MIRAPEX   1 mg, Oral, Daily      Prolia 60 MG/ML solution prefilled syringe syringe  Generic drug: denosumab   60 mL      saccharomyces boulardii 250 MG capsule  Commonly known as: FLORASTOR   250 mg, Oral, 2 Times Daily      traZODone 50 MG tablet  Commonly known as: DESYREL   100 mg, Oral, Nightly      vitamin D 1.25 MG  (84171 UT) capsule capsule  Commonly known as: ERGOCALCIFEROL   TAKE 1 CAPSULE BY MOUTH EVERY WEEK IN THE MORNING         Stop These Medications    carvedilol 6.25 MG tablet  Commonly known as: COREG     furosemide 20 MG tablet  Commonly known as: LASIX     montelukast 10 MG tablet  Commonly known as: SINGULAIR          Allergies   Allergen Reactions   • Sulfa Antibiotics Hives   • Erythromycin Base Rash   • Penicillins Rash   • Tetracycline Hcl Rash     Discharge Disposition:  Home or Self Care    Diet:  Diet Order   Procedures   • Diet Regular; Cardiac     Activity:  Activity Instructions     Activity as Tolerated          CODE STATUS:    Code Status and Medical Interventions:   Ordered at: 09/27/22 9615     Level Of Support Discussed With:    Patient     Code Status (Patient has no pulse and is not breathing):    CPR (Attempt to Resuscitate)     Medical Interventions (Patient has pulse or is breathing):    Full Support     No future appointments.    Additional Instructions for the Follow-ups that You Need to Schedule     Ambulatory Referral to Home Health   As directed      Face to Face Visit Date: 10/3/2022    Follow-up provider for Plan of Care?: I treated the patient in an acute care facility and will not continue treatment after discharge.    Follow-up provider: MIKAELA LUNA [[id number]]    Reason/Clinical Findings: s/p Hospitalization for vertigo    Describe mobility limitations that make leaving home difficult: Impaired functional mobility, balance, gait and endurance.    Nursing/Therapeutic Services Requested: Skilled Nursing Physical Therapy Occupational Therapy    Skilled nursing orders: Other (Assessment)    PT orders: Therapeutic exercise (resume) Gait Training Transfer training Strengthening Home safety assessment    Weight Bearing Status: As Tolerated    Occupational orders: Activities of daily living Energy conservation Strengthening Home safety assessment    Frequency: Other         Discharge  Follow-up with PCP   As directed       Currently Documented PCP:    Erin Rubio APRN    PCP Phone Number:    [phone]     Follow Up Details: 1 WEEK             Jovanna Vaca PA-C  10/03/22    Time Spent on Discharge:  I spent 40 minutes on this discharge activity which included: face-to-face encounter with the patient, reviewing the data in the system, coordination of the care with the nursing staff as well as consultants, documentation, and entering orders.

## 2022-10-03 NOTE — PLAN OF CARE
Pt VSS. NON-TELE. Plan for possible D/C today with home health. Will continue with POC.         Problem: Adult Inpatient Plan of Care  Goal: Plan of Care Review  Outcome: Ongoing, Progressing  Flowsheets (Taken 10/3/2022 0435)  Plan of Care Reviewed With: patient  Goal: Patient-Specific Goal (Individualized)  Outcome: Ongoing, Progressing  Goal: Absence of Hospital-Acquired Illness or Injury  Outcome: Ongoing, Progressing  Intervention: Identify and Manage Fall Risk  Recent Flowsheet Documentation  Taken 10/3/2022 0400 by Shannon Castellano RN  Safety Promotion/Fall Prevention:   activity supervised   assistive device/personal items within reach   clutter free environment maintained   fall prevention program maintained   lighting adjusted   nonskid shoes/slippers when out of bed   room organization consistent   safety round/check completed  Taken 10/3/2022 0200 by Shannon Castellano RN  Safety Promotion/Fall Prevention:   activity supervised   assistive device/personal items within reach   clutter free environment maintained   fall prevention program maintained   lighting adjusted   nonskid shoes/slippers when out of bed   room organization consistent   safety round/check completed  Taken 10/3/2022 0000 by Shannon Castellano RN  Safety Promotion/Fall Prevention:   activity supervised   assistive device/personal items within reach   clutter free environment maintained   fall prevention program maintained   lighting adjusted   nonskid shoes/slippers when out of bed   room organization consistent   safety round/check completed  Taken 10/2/2022 2200 by Shannon Castellano RN  Safety Promotion/Fall Prevention:   activity supervised   assistive device/personal items within reach   clutter free environment maintained   fall prevention program maintained   lighting adjusted   nonskid shoes/slippers when out of bed   room organization consistent   safety round/check completed  Taken 10/2/2022 2049 by Shannon Castellano  RN  Safety Promotion/Fall Prevention:   activity supervised   assistive device/personal items within reach   clutter free environment maintained   fall prevention program maintained   nonskid shoes/slippers when out of bed   lighting adjusted   room organization consistent   safety round/check completed  Intervention: Prevent Skin Injury  Recent Flowsheet Documentation  Taken 10/3/2022 0400 by Shannon Castellano RN  Body Position: position changed independently  Skin Protection:   adhesive use limited   incontinence pads utilized   tubing/devices free from skin contact  Taken 10/3/2022 0200 by Shannon Castellano RN  Body Position: position changed independently  Skin Protection:   adhesive use limited   incontinence pads utilized   tubing/devices free from skin contact  Taken 10/3/2022 0000 by Shannon Castellano RN  Body Position: position changed independently  Skin Protection:   adhesive use limited   incontinence pads utilized   tubing/devices free from skin contact  Taken 10/2/2022 2200 by Shannon Castellano RN  Body Position: position changed independently  Skin Protection:   adhesive use limited   incontinence pads utilized   tubing/devices free from skin contact  Taken 10/2/2022 2049 by Shannon Castellano RN  Body Position: position changed independently  Skin Protection:   adhesive use limited   incontinence pads utilized   tubing/devices free from skin contact  Intervention: Prevent and Manage VTE (Venous Thromboembolism) Risk  Recent Flowsheet Documentation  Taken 10/3/2022 0400 by Shannon Castellano RN  Activity Management: activity adjusted per tolerance  Taken 10/3/2022 0200 by Shannon Castellano RN  Activity Management: activity adjusted per tolerance  Taken 10/3/2022 0000 by Shannon Castellano RN  Activity Management: activity adjusted per tolerance  Taken 10/2/2022 2200 by Shannon Castellano RN  Activity Management: activity adjusted per tolerance  Taken 10/2/2022 2049 by Shannon Castellano  RN  Activity Management: activity adjusted per tolerance  VTE Prevention/Management: (SQ heparin)   other (see comments)   bilateral   sequential compression devices off   patient refused intervention  Range of Motion: active ROM (range of motion) encouraged  Intervention: Prevent Infection  Recent Flowsheet Documentation  Taken 10/3/2022 0400 by Shannon Castellano RN  Infection Prevention:   environmental surveillance performed   rest/sleep promoted   single patient room provided  Taken 10/3/2022 0200 by Shannon Castellano RN  Infection Prevention:   environmental surveillance performed   single patient room provided   rest/sleep promoted  Taken 10/3/2022 0000 by Shannon Castellano RN  Infection Prevention:   environmental surveillance performed   single patient room provided   rest/sleep promoted  Taken 10/2/2022 2200 by Shannon Castellano RN  Infection Prevention:   environmental surveillance performed   single patient room provided  Taken 10/2/2022 2049 by Shannon Castellano RN  Infection Prevention:   environmental surveillance performed   single patient room provided   rest/sleep promoted  Goal: Optimal Comfort and Wellbeing  Outcome: Ongoing, Progressing  Intervention: Provide Person-Centered Care  Recent Flowsheet Documentation  Taken 10/2/2022 2049 by Shannon Castellano RN  Trust Relationship/Rapport:   care explained   choices provided   questions encouraged   questions answered   thoughts/feelings acknowledged  Goal: Readiness for Transition of Care  Outcome: Ongoing, Progressing   Goal Outcome Evaluation:  Plan of Care Reviewed With: patient

## 2022-10-03 NOTE — CASE MANAGEMENT/SOCIAL WORK
Discharge Planning Assessment  [facility]     Patient Name: Ila Galaviz  MRN: [id number]  Today's Date: 10/3/2022    Admit Date: 9/27/2022    Plan: Home with family assistance and [facility]   Discharge Needs Assessment    No documentation.                Discharge Plan     Row Name 10/03/22 1532       Plan    Plan Home with family assistance and [facility]    Plan Comments Ms. Galaviz is being discharged home today with her family to assist her and [facility].  She is current with [facility] for home PT.  Orders called and faxed to Sharifa with [facility] for skilled nursing, PT and OT.  Ms. Galaviz is agreeable to [facility].  Her  will be transporting her home at discharge.    Final Discharge Disposition Code 06 - home with home health care              Continued Care and Services - Discharged on 10/3/2022 Admission date: 9/27/2022 - Discharge disposition: Home or Self Care    Destination     Service Provider Request Status Selected Services Address Phone Fax Patient Preferred    [facility]  Declined  Insurance Denial N/A [address] [address] [phone] --          Home Medical Care Coordination complete.    Service Provider Request Status Selected Services Address Phone Fax Patient Preferred    [facility] [id number] [phone] --              Expected Discharge Date and Time     Expected Discharge Date Expected Discharge Time    Oct 3, 2022                    Sheree White RN

## 2022-10-03 NOTE — DISCHARGE PLACEMENT REQUEST
"Alvin Bahena (87 y.o. Female)   Patient is being discharged today.  Thank you, Sheree DEGROOT BSN, Case Management, ph [phone]            Date of Birth   1935    Social Security Number       Address   [address]  [facility] [address]    Home Phone   [phone]    MRN   [id number]       B[employer]ism   Congregational    Marital Status                               Admission Date   9/27/22    Admission Type   Emergency    Admitting Provider   Valeria Gorman MD    Attending Provider   Valeria Gorman MD    Department, Room/Bed   [facility] 3F, S301/1       Discharge Date       Discharge Disposition       Discharge Destination                               Attending Provider: Valeria Gorman MD    Allergies: Sulfa Antibiotics, Erythromycin Base, Penicillins, Tetracycline Hcl    Isolation: Droplet   Infection: Rhinovirus  (10/02/22)   Code Status: CPR   Advance Care Planning Activity    Ht: 162.6 cm (64.02\")   Wt: 63.5 kg (139 lb 15.9 oz)    Admission Cmt: None   Principal Problem: None                Active Insurance as of 9/27/2022     Primary Coverage     Payor Plan Insurance Group Employer/Plan Group    [facility] MEDICARE REPLACEMENT [facility] MEDICARE REPLACEMENT [id number]     Payor Plan Address Payor Plan Phone Number Payor Plan Fax Number Effective Dates    [address] [address]   1/1/2022 - None Entered    [facility] [id number]       Subscriber Name Subscriber Birth Date Member ID       ALVIN BAHENA 1935 [id number]                 Emergency Contacts      (Rel.) Home Phone Work Phone Mobile Phone    KarelyMisty alvarengai (Daughter) -- -- [phone]    DENZEL Bahena (Spouse) [phone] -- --    Patsy Levine (Friend) -- -- [phone]          [address]  [address] [facility] [address]  Phone:  [phone]  Fax:  [phone] Date: Oct 3, 2022      Ambulatory Referral to Home Health     Patient:  Alvin Bahena MRN:  [id number] "   [address] [place] [address] :  1935  SSN:    Phone: [phone] Sex:  F      INSURANCE PAYOR PLAN GROUP # SUBSCRIBER ID   Primary:    [employer] MEDICARE REPLACEMENT [id number]      Referring Provider Information:  SARAI PERDOMO Phone: [phone] Fax: [phone]       Referral Information:   # Visits:  999 Referral Type: Home Health [42]   Urgency:  Routine Referral Reason: Specialty Services Required   Start Date: Oct 3, 2022 End Date:  To be determined by Insurer   Diagnosis: Vertigo (R42 [ICD-10-CM] 780.4 [ICD-9-CM])  Generalized weakness (R53.1 [ICD-10-CM] 780.79 [ICD-9-CM])  Inability to walk (R26.2 [ICD-10-CM] 719.7 [ICD-9-CM])  Cognitive changes (R41.89 [ICD-10-CM] 799.59 [ICD-9-CM])  Weakness (R53.1 [ICD-10-CM] 780.79 [ICD-9-CM])  Stage 3b chronic kidney disease (HCC) (N18.32 [ICD-10-CM] 585.3 [ICD-9-CM])  NICM (nonischemic cardiomyopathy) (HCC) (I42.8 [ICD-10-CM] 425.4 [ICD-9-CM])      Refer to Dept:   Refer to Provider:   Refer to Provider Phone:   Refer to Facility:       Face to Face Visit Date: 10/3/2022  Follow-up provider for Plan of Care? I treated the patient in an acute care facility and will not continue treatment after discharge.  Follow-up provider: MIKAELA LUNA [[id number]]  Reason/Clinical Findings: s/p Hospitalization for vertigo  Describe mobility limitations that make leaving home difficult: Impaired functional mobility, balance, gait and endurance.  Nursing/Therapeutic Services Requested: Skilled Nursing  Nursing/Therapeutic Services Requested: Physical Therapy  Nursing/Therapeutic Services Requested: Occupational Therapy  Skilled nursing orders: Other (Assessment)  PT orders: Therapeutic exercise (resume)  PT orders: Gait Training  PT orders: Transfer training  PT orders: Strengthening  PT orders: Home safety assessment  Weight Bearing Status: As Tolerated  Occupational orders: Activities of daily living  Occupational orders:  Energy conservation  Occupational orders: Strengthening  Occupational orders: Home safety assessment  Frequency: Other     This document serves as a request of services and does not constitute Insurance authorization or approval of services.  To determine eligibility, please contact the members Insurance carrier to verify and review coverage.     If you have medical questions regarding this request for services. Please contact [address] at [phone] during normal business hours.        Authorizing Provider:Valeria Gorman MD  Authorizing Provider's NPI: [id number]  Order Entered By: Sheere White RN 10/3/2022  8:48 AM     Electronically signed by: Valeria Gorman MD 10/3/2022  8:48 AM            Physician Progress Notes (most recent note)      Jovanna Vaca PA-C at 10/02/22 1218     Attestation signed by Valeria Gorman MD at 10/02/22 1932    I have reviewed this documentation and agree.                      [facility] Medicine Services  PROGRESS NOTE    Patient Name: Ila Galaviz  : 1935  MRN: [id number]    Date of Admission: 2022  Primary Care Physician: Erin Rubio APRN    Subjective     CC: f/u dizziness     HPI:  Sitting up in bed. Was able to have a bowel movement last night. Says she got up this morning to go to the bathroom and did not feel dizzy. She complains of sore throat and cough. Ribs hurt from coughing all night. Daughter was sick with similar symptoms last week. Daughter tested negative for COVID.     ROS:  Gen- No fevers, chills. (+) sore throat  CV- No chest pain, palpitations  Resp- (+) cough, no dyspnea  GI- No N/V/D, abd pain    Objective     Vital Signs:   Temp:  [97.6 °F (36.4 °C)-98.2 °F (36.8 °C)] 97.7 °F (36.5 °C)  Heart Rate:  [] 82  Resp:  [16-18] 18  BP: (128-185)/(67-89) 150/77  Flow (L/min):  [1] 1     Physical Exam:  Constitutional: No acute distress, awake, alert and conversant. Sitting upright in bed    HENT: NCAT, mucous membranes moist  Respiratory: Clear to auscultation bilaterally, respiratory effort normal on room air   Cardiovascular: RRR, no murmurs, rubs, or gallops  Gastrointestinal: Positive bowel sounds, soft, nontender, nondistended  Musculoskeletal: No bilateral ankle edema  Psychiatric: Anxious affect, cooperative  Neurologic: Oriented x 3, moves all extremities spontaneously without focal deficits, speech clear and coherent   Skin: No rashes to exposed surfaces     Results Reviewed:  LAB RESULTS:      Lab 09/28/22  0456 09/27/22  0959   WBC 8.60 8.91   HEMOGLOBIN 12.7 14.7   HEMATOCRIT 38.4 44.4   PLATELETS 253 278   NEUTROS ABS  --  6.19   IMMATURE GRANS (ABS)  --  0.02   LYMPHS ABS  --  2.03   MONOS ABS  --  0.43   EOS ABS  --  0.20   MCV 86.1 86.0         Lab 10/01/22  0503 09/28/22  0456 09/27/22  1015 09/27/22  0959   SODIUM 139 141  --  142   POTASSIUM 4.8 4.0  --  4.3   CHLORIDE 105 106  --  105   CO2 24.0 23.0  --  25.0   ANION GAP 10.0 12.0  --  12.0   BUN 32* 35*  --  39*   CREATININE 1.66* 1.61* 1.70* 1.39*   EGFR 30.1* 31.2*  --  37.3*   GLUCOSE 95 94  --  107*   CALCIUM 9.0 8.5*  --  9.5   MAGNESIUM  --   --   --  1.9   HEMOGLOBIN A1C  --  6.20*  --   --    TSH  --  2.070  --   --          Lab 09/27/22  0959   TOTAL PROTEIN 7.8   ALBUMIN 4.40   GLOBULIN 3.4   ALT (SGPT) 12   AST (SGOT) 14   BILIRUBIN 0.7   ALK PHOS 73         Lab 09/27/22  0959   TROPONIN T <0.010         Lab 09/28/22  0456   CHOLESTEROL 188   LDL CHOL 122*   HDL CHOL 38*   TRIGLYCERIDES 158*     Brief Urine Lab Results  (Last result in the past 365 days)      Color   Clarity   Blood   Leuk Est   Nitrite   Protein   CREAT   Urine HCG        09/27/22 1019 Yellow   Clear   Negative   Negative   Negative   30 mg/dL (1+)               Microbiology Results Abnormal     None        No radiology results from the last 24 hrs    Results for orders placed during the hospital encounter of 09/27/22    Adult Transthoracic Echo  Complete W/ Cont if Necessary Per Protocol (With Agitated Saline)    Interpretation Summary  · Left ventricular ejection fraction appears to be 51 - 55%.  · Left ventricular wall thickness is consistent with mild concentric hypertrophy.  · Left atrial volume is moderately increased.  · Mild mitral valve stenosis is present. The mitral valve peak gradient is 11.2 mmHg. The mitral valve mean gradient is 5 mmHg. The mitral valve area (PHT) is 2.5 cm2.  · Left ventricular diastolic function is consistent with (grade I) impaired relaxation.  · Mild to moderate aortic valve regurgitation is present  · Electronic lead present in the right ventricle    I have reviewed the medications:  Scheduled Meds:amLODIPine, 2.5 mg, Oral, Q24H  atorvastatin, 40 mg, Oral, Nightly  escitalopram, 5 mg, Oral, Daily  guaifenesin-dextromethorphan, 2 tablet, Oral, Q12H  heparin (porcine), 5,000 Units, Subcutaneous, Q8H  levothyroxine, 112 mcg, Oral, Daily  losartan, 50 mg, Oral, Q24H  montelukast, 10 mg, Oral, Nightly  polyethylene glycol, 17 g, Oral, Daily  pramipexole, 1 mg, Oral, Daily  rOPINIRole, 1 mg, Oral, Nightly  saccharomyces boulardii, 250 mg, Oral, Daily  sodium chloride, 10 mL, Intravenous, Q12H  sodium chloride, 10 mL, Intravenous, Q12H  traZODone, 50 mg, Oral, Nightly      Continuous Infusions:   PRN Meds:.•  acetaminophen **OR** acetaminophen **OR** acetaminophen  •  benzonatate  •  senna-docusate sodium **AND** polyethylene glycol **AND** bisacodyl **AND** bisacodyl  •  meclizine  •  ondansetron **OR** ondansetron  •  sodium chloride  •  sodium chloride  •  sodium chloride  •  traMADol    Assessment & Plan     Active Hospital Problems    Diagnosis  POA   • Pre-diabetes [R73.03]  Yes   • RLS (restless legs syndrome) [G25.81]  Yes   • Vertigo [R42]  Yes   • Hypothyroidism (acquired) [E03.9]  Yes   • HTN (hypertension) [I10]  Yes   • CKD (chronic kidney disease) stage 3, GFR 30-59 ml/min ([facility]) [N18.30]  Yes   • NICM (nonischemic  cardiomyopathy) ([facility]) [I42.8]  Yes      Resolved Hospital Problems   No resolved problems to display.     Brief Hospital Course to date:  Ila Galaviz is an 87 y.o. female with PMH significant for HTN, non-ischemic cardiomyopathy, SSS s/p PPM, CKD III, hypothyroidism, RLS and prediabetes. She was admitted to [facility] on 9/27/22 for evaluation of dizziness and lower extremity weakness. She does note chronic RLE weakness (etiology unknown).     Dizziness, improved  - No orthostatic hypotension  - Not clearly vertiginous  - CT head and CTA head/neck were non-revealing for acute stroke. Unable to obtain MRI due to PPM. Stroke neurology team feels posterior stroke is unlikely, particularly in light of episodic symptoms  - ECHO without significant valvular disease  - Some concern for medication-induced etiology. Patient on Ropinerole and Pramipexole- both have dizziness as a significant side effect. My partner decreased Ropinerole to QHS dosing on 10/1  - PT/OT following - recommend rehab. Insurance is requesting a peer to peer    Lower extremity weakness   - CK WNL. Unremarkable stroke work up as above   - This appears slowly progressive and chronic, possibly 2/2 debility and failure to thrive  - Continue PT/OT    Prediabetes  - Hgb A1c 6.2%  - New diagnosis this admission. Would favor monitoring only at this time, given her age and comorbidities. PCP can recheck in 6 months     Anxiety  - Started on Lexapro 5mg daily this admission  - My partner discussed s/e profile in detail with daughter over phone on 10/1  - Add Hydroxyzine 10mg TID PRN for anxiety / sleep     Chronic diastolic CHF   Nonischemic cardiomyopathy  SSS s/p PPM  - Per review of records, history of systolic CHF with EF 25-30% per [facility] in 2018  - Follows with Kesha LOREDO at [facility]  - Holding home Lasix 20mg every other day for now    Hypertension  - Continue Losartan 50mg daily, Amlodipine 2.5mg PO daily     CKD IIIb  -  Creatinine stable/at baseline  - Encourage PO fluid intake  - AM BMP     RLS, continue Mirapex QAM, Requip QHS   Hypothyroidism, TSH WNL Continue Synthroid     Cough / chest congestion  - Check respiratory viral PCR  - Add Mucinex DM BID - encourage PO fluids      Expected Discharge Location and Transportation: [facility] via private vehicle or medical transport van   Expected Discharge Date: 10/3/22     DVT prophylaxis:Medical and mechanical DVT prophylaxis orders are present.     AM-PAC 6 Clicks Score (PT): 17 (22 0800)    CODE STATUS:   Code Status and Medical Interventions:   Ordered at: 22 1454     Level Of Support Discussed With:    Patient     Code Status (Patient has no pulse and is not breathing):    CPR (Attempt to Resuscitate)     Medical Interventions (Patient has pulse or is breathing):    Full Support     Jovanna Vaca PA-C  10/02/22                Electronically signed by Valeria Gorman MD at 10/02/22 1932          Consult Notes (most recent note)      Cheli Carolina APRN at 22 1346     Attestation signed by Bart Alston MD at 22 1550    I have reviewed this documentation and agree.                  Stroke Consult Note    Patient Name: Ila Galaviz   MRN: [id number]  Age: 87 y.o.  Sex: female  : 1935    Primary Care Physician: Erin Rubio APRN  Referring Physician:  Salty Paredes MD    TIME STROKE TEAM CALLED: 1339 EST     TIME PATIENT SEEN: 1347 EST    Handedness: Right  Race:     Chief Complaint/Reason for Consultation: Dizziness, weakness    HPI: Ila Galaviz is an 87 year old female with known medical history of CHF, SSS s/p pacemaker, peripheral neuropathy, hypothyroidism, HTN, breast cancer s/p chemo and radiation, and thyroid cancer who presents to [facility] for evaluation of dizziness and weakness that started yesterday evening around 1700. She states that she was making dinner and had an acute onset of dizziness and weakness which  limited her mobility. She normally ambulates with the assistance of a walker and has baseline weakness, the weakness was much worse overnight and required that she sit on the seat of her walker and scoot to go from room to room overnight.     She was seen by Dr. Paredes upon ER arrival this AM, she was initially evaluated with CT head which was negative for acute intracranial abnormality, no hemorrhage. She also had a CTA head/neck which was negative for large vessel occlusion, high grade stenosis, or aneurysm.     Stroke service was asked to see patient once her symptoms were not resolving with the addition of Meclizine. She tells me that the dizziness has resolved with Meclizine but that upon attempting to stand she is much weaker than her baseline status. RLE is weaker than LLE at baseline but she is normally able to ambulate with the assistance of a walker, today when she attempted to stand she did not feel that her legs could hold her up and was concerned about falling.     On exam NIHSS = 1 (decreased sensation RLE). She does have generalized weakness and RLE weakness which she states is chronic, no drift noted, but does demonstrate weakness as compared to LLE. No saddle anesthesia. She also has decreased sensation in her RLE which she also states is baseline. PERRL, EOMI. No nystagmus. At baseline she also has decreased visual acuity in her left eye d/t macular degeneration. She is able to correctly state number of fingers in all visual fields and track examiner but states that at baseline the visual acuity in her left eye is less than her right. /93. She does not take any anticoagulant or antiplatelet medications at home.     She currently denies headache but states that she has been having intermittent left sided headaches that start at her left temple and radiate down her neck, each episode lasts about 30 seconds and typically happens right before bed. She states that this has been occurring for  "\"awhile\" but worse in the last few days.     She follows with outpatient neurology, Dr. Salas at [facility], for her bilateral leg weakness R>L.     Last Known Normal Date/Time: 9/26/2022 1700  EST     Review of Systems   Constitutional: Negative for fatigue and fever.   HENT: Negative for trouble swallowing.    Eyes: Negative for photophobia and visual disturbance.   Respiratory: Negative for cough and shortness of breath.    Cardiovascular: Negative for chest pain and palpitations.   Gastrointestinal: Negative for diarrhea, nausea and vomiting.   Genitourinary: Negative for dysuria and frequency.   Musculoskeletal: Positive for gait problem.   Neurological: Positive for dizziness, weakness, numbness and headaches.   Psychiatric/Behavioral: Negative for confusion.      Past Medical History:   Diagnosis Date   • CHF (congestive heart failure) (HCC)    • Disease of thyroid gland    • Hypertension    • Kidney disease    • Kidney stone      Past Surgical History:   Procedure Laterality Date   • KIDNEY STONE SURGERY     • THYROID SURGERY       History reviewed. No pertinent family history.  Social History     Socioeconomic History   • Marital status:    Tobacco Use   • Smoking status: Never Smoker   • Smokeless tobacco: Never Used   Vaping Use   • Vaping Use: Never used   Substance and Sexual Activity   • Alcohol use: Never   • Drug use: Never   • Sexual activity: Defer     Allergies   Allergen Reactions   • Sulfa Antibiotics Hives   • Erythromycin Base Rash   • Penicillins Rash   • Tetracycline Hcl Rash     Prior to Admission medications    Medication Sig Start Date End Date Taking? Authorizing Provider   acetaminophen (TYLENOL) 500 MG tablet Take 500 mg by mouth Every 6 (Six) Hours As Needed for Mild Pain .   Yes Emergency, Nurse Carlos Enrique RN   carvedilol (COREG) 6.25 MG tablet Take 6.25 mg by mouth 2 (Two) Times a Day With Meals.   Yes Emergency, Nurse Carlos Enrique RN   denosumab (Prolia) 60 MG/ML solution " prefilled syringe syringe 60 mL.   Yes Emergency, Nurse Epic, RN   furosemide (LASIX) 20 MG tablet Take 20 mg by mouth Daily. 1 every other day   Yes Jyoit Dai MD   ipratropium (ATROVENT) 0.03 % nasal spray Every 12 (Twelve) Hours.   Yes Jyoti Dai MD   IRBESARTAN PO Take  by mouth.   Yes Regan, Nurse Carlos Enrique RN   levothyroxine (SYNTHROID, LEVOTHROID) 112 MCG tablet Take 112 mcg by mouth Daily.   Yes Regan, Nurse Epic, RN   montelukast (SINGULAIR) 10 MG tablet Take 10 mg by mouth Every Night.   Yes Regan, Nurse Epic, RN   Multiple Vitamins-Minerals (CENTRUM SILVER PO) Take  by mouth.   Yes Regan, Nurse Epic, RN   Multiple Vitamins-Minerals (PRESERVISION AREDS PO) Take  by mouth.   Yes Regan, Nurse Carlos Enrique RN   ondansetron ODT (ZOFRAN-ODT) 4 MG disintegrating tablet Place 1 tablet on the tongue 4 (Four) Times a Day As Needed for Nausea or Vomiting. 6/23/22  Yes Esa Eid,    pramipexole (MIRAPEX) 0.5 MG tablet Take 1 mg by mouth Daily.   Yes ProviderJyoti MD   rOPINIRole (REQUIP) 1 MG tablet Take 1 mg by mouth 2 (Two) Times a Day. Take 1 hour before bedtime.   Yes Provider, Historical, MD   saccharomyces boulardii (FLORASTOR) 250 MG capsule Take 250 mg by mouth 2 (Two) Times a Day.   Yes Emergency, Nurse Epic, RN   traZODone (DESYREL) 50 MG tablet Take 100 mg by mouth Every Night.   Yes Regan, Nurse Carlos Enrique RN   vitamin D (ERGOCALCIFEROL) 1.25 MG (74494 UT) capsule capsule TAKE 1 CAPSULE BY MOUTH EVERY WEEK IN THE MORNING 8/3/22  Yes Jyoti Dai MD   amLODIPine (NORVASC) 2.5 MG tablet amlodipine 2.5 mg tablet   Take 1 tablet every day by oral route in the morning for 30 days.    ProviderJyoti MD   multivitamin with minerals tablet tablet ICaps AREDS2   Take one daily    Emergency, Nurse Epic, RN         Temp:  [98 °F (36.7 °C)] 98 °F (36.7 °C)  Heart Rate:  [70-84] 77  Resp:  [22] 22  BP: (144-187)/(76-92) 157/85     Neurological  Exam  Mental Status  Awake, alert and oriented to person, place and time. Oriented to person, place and time. Oriented to person, place, and time. Speech is normal. Language is fluent with no aphasia.    Cranial Nerves  CN II: Visual fields full to confrontation.  CN III, IV, VI: Extraocular movements intact bilaterally. Normal lids and orbits bilaterally. Pupils equal round and reactive to light bilaterally.  CN V: Facial sensation is normal.  CN VII: Full and symmetric facial movement.  CN VIII: Hearing appears intact.  CN IX, X: Palate elevates symmetrically  CN XI: Shoulder shrug strength is normal.  CN XII: Tongue midline without atrophy or fasciculations.    Motor  Decreased muscle bulk throughout. No fasciculations present. Normal muscle tone. No abnormal involuntary movements.  RUE 4/5  LUE 4/5  RLE 3/5  LLE 4/5.    Sensory  Light touch abnormality: Baseline decreased sensation in RLE, states that she also has BLE neuropathy.     Reflexes                                            Right                      Left  Plantar                           Downgoing                Downgoing    Coordination  Right: Finger-to-nose normal. Rapid alternating movement normal. Heel-to-shin normal.Left: Finger-to-nose normal. Rapid alternating movement normal. Heel-to-shin normal.    Gait   Abnormal gait.  Not observed secondary to weakness. Per Dr. Paredes patient required 2 person assist to stand and was unable to ambulate .      Physical Exam  Vitals reviewed.   Constitutional:       General: She is not in acute distress.     Appearance: She is not ill-appearing.   HENT:      Head: Normocephalic and atraumatic.      Mouth/Throat:      Mouth: Mucous membranes are moist.      Pharynx: Oropharynx is clear.   Eyes:      General: Lids are normal.      Extraocular Movements: Extraocular movements intact.      Pupils: Pupils are equal, round, and reactive to light.   Cardiovascular:      Rate and Rhythm: Normal rate.       Comments: Paced  Pulmonary:      Effort: Pulmonary effort is normal. No respiratory distress.      Comments: Room air  Musculoskeletal:      Right lower leg: No edema.      Left lower leg: No edema.   Skin:     General: Skin is warm and dry.   Neurological:      Mental Status: She is oriented to person, place, and time.      Sensory: Sensory deficit present.      Motor: Weakness present.      Gait: Gait abnormal.   Psychiatric:         Speech: Speech normal.         Acute Stroke Data    Thrombolytic Inclusion / Exclusion Criteria    Time: 13:47 EDT  Person Administering Scale: FACUNDO Pina    Inclusion Criteria  [x]   18 years of age or greater   []   Onset of symptoms < 4.5 hours before beginning treatment (stroke onset = time patient was last seen well or without symptoms).   []   Diagnosis of acute ischemic stroke causing measurable disabling deficit (Complete Hemianopia, Any Aphasia, Visual or Sensory Extinction, Any weakness limiting sustained effort against gravity)   []   Any remaining deficit considered potentially disabling in view of patient and practitioner   Exclusion criteria (Do not proceed with Alteplase if any are checked under exclusion criteria)  [x]   Onset unknown or GREATER than 4.5 hours   []   ICH on CT/MRI   []   CT demonstrates hypodensity representing acute or subacute infarct   []   Significant head trauma or prior stroke in the previous 3 months   []   Symptoms suggestive of subarachnoid hemorrhage   []   History of un-ruptured intracranial aneurysm GREATER than 10 mm   []   Recent intracranial or intraspinal surgery within the last 3 months   []   Arterial puncture at a non-compressible site in the previous 7 days   []   Active internal bleeding   []   Acute bleeding tendency   []   Platelet count LESS than 100,000 for known hematological diseases such as leukemia, thrombocytopenia or chronic cirrhosis   []   Current use of anticoagulant with INR GREATER than 1.7 or PT GREATER  than 15 seconds, aPTT GREATER than 40 seconds   []   Heparin received within 48 hours, resulting in abnormally elevated aPTT GREATER than upper limit of normal   []   Current use of direct thrombin inhibitors or direct factor Xa inhibitors in the past 48 hours   []   Elevated blood pressure refractory to treatment (systolic GREATER than 185 mm/Hg or diastolic  GREATER than 110 mm/Hg   []   Suspected infective endocarditis and aortic arch dissection   []   Current use of therapeutic treatment dose of low-molecular-weight heparin (LMWH) within the previous 24 hours   []   Structural GI malignancy or bleed   Relative exclusion for all patients  []   Only minor non-disabling symptoms   []   Pregnancy   []   Seizure at onset with postictal residual neurological impairments   []   Major surgery or previous trauma within past 14 days   []   History of previous spontaneous ICH, intracranial neoplasm, or AV malformation   []   Postpartum (within previous 14 days)   []   Recent GI or urinary tract hemorrhage (within previous 21 days)   []   Recent acute MI (within previous 3 months)   []   History of un-ruptured intracranial aneurysm LESS than 10 mm   []   History of ruptured intracranial aneurysm   []   Blood glucose LESS than 50 mg/dL (2.7 mmol/L)   []   Dural puncture within the last 7 days   []   Known GREATER than 10 cerebral microbleeds   Additional exclusions for patients with symptoms onset between 3 and 4.5 hours.  []   Age > 80.   []   On any anticoagulants regardless of INR  >>> Warfarin (Coumadin), Heparin, Enoxaparin (Lovenox), fondaparinux (Arixtra), bivalirudin (Angiomax), Argatroban, dabigatran (Pradaxa), rivaroxaban (Xarelto), or apixaban (Eliquis)   []   Severe stroke (NIHSS > 25).   []   History of BOTH diabetes and previous ischemic stroke.   []   The risks and benefits have been discussed with the patient or family related to the administration of IV thrombolytic therapy for stroke symptoms.   []   I have  discussed and reviewed the patient's case and imaging with the attending prior to IV thrombolytic therapy.   N/A Time IV thrombolytic administered       Hospital Meds:  Scheduled- losartan, 50 mg, Oral, Q24H      Infusions-     PRNs- sodium chloride    Functional Status Prior to Current Stroke/Cottle Score: 3    NIH Stroke Scale  Time: 13:47 EDT  Person Administering Scale: FACUNDO Pina    Interval: baseline  1a. Level of Consciousness: 0-->Alert, keenly responsive  1b. LOC Questions: 0-->Answers both questions correctly  1c. LOC Commands: 0-->Performs both tasks correctly  2. Best Gaze: 0-->Normal  3. Visual: 0-->No visual loss  4. Facial Palsy: 0-->Normal symmetrical movements  5a. Motor Arm, Left: 0-->No drift, limb holds 90 (or 45) degrees for full 10 secs  5b. Motor Arm, Right: 0-->No drift, limb holds 90 (or 45) degrees for full 10 secs  6a. Motor Leg, Left: 0-->No drift, leg holds 30 degree position for full 5 secs  6b. Motor Leg, Right: 0-->No drift, leg holds 30 degree position for full 5 secs  7. Limb Ataxia: 0-->Absent  8. Sensory: 1-->Mild-to-moderate sensory loss, patient feels pinprick is less sharp or is dull on the affected side, or there is a loss of superficial pain with pinprick, but patient is aware of being touched  9. Best Language: 0-->No aphasia, normal  10. Dysarthria: 0-->Normal  11. Extinction and Inattention (formerly Neglect): 0-->No abnormality    Total (NIH Stroke Scale): 1     Results Reviewed:  I have personally reviewed current lab, radiology, and data and agree with results.    Results for orders placed during the hospital encounter of 02/05/22    Adult Transthoracic Echo Complete W/ Cont if Necessary Per Protocol    Interpretation Summary  · Left ventricular ejection fraction appears to be 51 - 55%. Left ventricular systolic function is normal.  · Left ventricular wall thickness is consistent with mild concentric hypertrophy.  · There is calcification of the aortic  valve.  · Mild mitral valve stenosis is present.  · Estimated right ventricular systolic pressure from tricuspid regurgitation is normal (<35 mmHg). Calculated right ventricular systolic pressure from tricuspid regurgitation is 18 mmHg.  · Left ventricular diastolic function is consistent with (grade Ia w/high LAP) impaired relaxation.     CT Head Without Contrast    Result Date: 9/27/2022    1.  Mild generalized parenchymal volume loss and changes of chronic small vessel ischemic disease.  No acute intracranial abnormality identified.  This report was finalized on 9/27/2022 12:46 PM by Manas Duran MD.      CT Angiogram Neck    Result Date: 9/27/2022   1.  No evidence of carotid or vertebral artery stenosis. 2.  No intracranial vascular stenosis, occlusion, or aneurysm. 3.  No pathologic contrast enhancement.  This report was finalized on 9/27/2022 12:51 PM by Manas Duran MD.      CT Angiogram Head    Result Date: 9/27/2022   1.  No evidence of carotid or vertebral artery stenosis. 2.  No intracranial vascular stenosis, occlusion, or aneurysm. 3.  No pathologic contrast enhancement.  This report was finalized on 9/27/2022 12:51 PM by Manas Duran MD.      CT head negative for acute intracranial abnormality, no hemorrhage  CTA head neck negative for large vessel occlusion, stenosis, or aneurysm    Glucose 107  Sodium 142  Creatinine 1.70  AST 12  ALT 14  WBC 8.91  Hemoglobin 14.7  Hematocrit 44.4  Platelets 278  UA positive for protein    Assessment/Plan:    Ila Galaviz is an 87 year old female with known medical history of CHF, SSS s/p pacemaker, peripheral neuropathy, hypothyroidism, HTN, breast cancer s/p chemo and radiation, and thyroid cancer who presents to [facility] for evaluation of dizziness and weakness that started yesterday evening around 1700. She states that she was making dinner and had an acute onset of dizziness and weakness which limited her mobility. She normally ambulates with the assistance of  a walker and has baseline weakness, the weakness was much worse overnight and required that she sit on the seat of her walker and scoot to go from room to room overnight.       Patient is not a candidate for IV TNKase secondary to last known well greater than 4.5 hours  Patient is not a candidate for emergent endovascular intervention due to no LVO on CTA    Antiplatelet PTA: None  Anticoagulant PTA: None        1. Dizziness, weakness  1. Differential includes vertigo versus posterior circulation CVA. Patient dizziness did respond to meclizine but she is still experiencing increased weakness as compared to her baseline  2. Will initiate TIA/ischemic stroke order set without thrombolytic therapy  3. Patient reports that previously she had been told that she could not have an MRI due to her pacemaker, her daughter is requesting that we order MRI, I discussed with both patient and daughter that I will place a cardiology consult for MRI clearance and if patient's pacemaker is not MRI compatible then we will pursue a repeat CT head tomorrow morning  4. Aspirin 325 mg  5. Atorvastatin 80 mg  6. A1c  7. Lipid panel  8. TTE, no bubble  9. N.p.o. pending bedside dysphagia screen, when cleared recommend cardiac diet  10. Activity as tolerated, PT/OT to work with patient in a.m.  2. Hypertension  1. Please allow for permissive hypertension overnight, SBP <220  2. Hold home antihypertensives overnight      Plan of care discussed with ED physician, Dr. Paredes, patient, and patient's daughter.  Thank you for allowing us to participate in the care of this patient, stroke neurology will continue to follow.    Cheli Carolina, APRN  2022  13:47 EDT    Electronically signed by Bart Alston MD at 22 5700          Physical Therapy Notes (most recent note)      Bibi Sun, PT at 22 1322  Version 1 of 1         Patient Name: Ila Galaviz  : 1935    MRN: [id number]                               Today's Date: 9/29/2022       Admit Date: 9/27/2022    Visit Dx:     ICD-10-CM ICD-9-CM   1. Vertigo  R42 780.4   2. Generalized weakness  R53.1 780.79   3. Inability to walk  R26.2 719.7     Patient Active Problem List   Diagnosis   • Recurrent UTI   • Leukocytosis   • Weakness   • CKD (chronic kidney disease) stage 3, GFR 30-59 ml/min ([facility])   • NICM (nonischemic cardiomyopathy) ([facility])   • HTN (hypertension)   • Hypothyroidism (acquired)   • Lower urinary tract symptoms (LUTS)   • Vertigo   • Hypothyroidism (acquired)     Past Medical History:   Diagnosis Date   • CHF (congestive heart failure) ([facility])    • Disease of thyroid gland    • Hypertension    • Kidney disease    • Kidney stone      Past Surgical History:   Procedure Laterality Date   • KIDNEY STONE SURGERY     • THYROID SURGERY        General Information     Row Name 09/29/22 1400          Physical Therapy Time and Intention    Document Type therapy note (daily note)  -CD     Mode of Treatment physical therapy  -CD     Row Name 09/29/22 1400          General Information    Patient Profile Reviewed yes  -CD     Existing Precautions/Restrictions fall  monitor BP, BLE weakness (R>L), increased anxiety w/ mobiltiy  -CD     Row Name 09/29/22 1400          Cognition    Orientation Status (Cognition) oriented x 3  -CD     Row Name 09/29/22 1400          Safety Issues, Functional Mobility    Safety Issues Affecting Function (Mobility) insight into deficits/self-awareness;safety precaution awareness;safety precautions follow-through/compliance;sequencing abilities  -CD     Impairments Affecting Function (Mobility) balance;endurance/activity tolerance;strength  -CD     Comment, Safety Issues/Impairments (Mobility) PT IS ANXIOUS WITH MOBILITY- NEEDS ENCOURAGEMENT AND SLOW CALM COMMANDS.  -CD           User Key  (r) = Recorded By, (t) = Taken By, (c) = Cosigned By    Initials Name Provider Type    CD Bibi Sun, PT Physical Therapist               Mobility      Row Name 09/29/22 1405          Bed Mobility    Comment, (Bed Mobility) PT SITTING UIC UPON ARRIVAL ON RA. DTR PRESENT WITH MULTIPLE QUESTIONS RE: D/C PLANNING-  NOTIFIED.  -CD     Row Name 09/29/22 1405          Transfers    Comment, (Transfers) CUES FOR HAND PLACEMENT. STS FROM RECLINER AND COMMODE. PT INDEPENDENT WITH HYGIENE AFTER TOILETING.,  -CD     Row Name 09/29/22 1405          Sit-Stand Transfer    Sit-Stand [place] (Transfers) contact guard;verbal cues  -CD     Assistive Device (Sit-Stand Transfers) walker, front-wheeled  -CD     Row Name 09/29/22 1405          Gait/Stairs (Locomotion)    [place] Level (Gait) contact guard;verbal cues  -CD     Assistive Device (Gait) walker, front-wheeled  -CD     Distance in Feet (Gait) 100 FEET  -CD     Deviations/Abnormal Patterns (Gait) stride length decreased;gait speed decreased;roberto carlos decreased  -CD     Bilateral Gait Deviations forward flexed posture;heel strike decreased  -CD     Comment, (Gait/Stairs) LESS TREMULOUS TODAY WITH MOBILITY. CUES FOR UPRIGHT POSTURE AND PLB.  -CD           User Key  (r) = Recorded By, (t) = Taken By, (c) = Cosigned By    Initials Name Provider Type    CD Bibi Sun, PT Physical Therapist               Obj/Interventions     Row Name 09/29/22 1407          Motor Skills    Therapeutic Exercise --  2 SETS OF 20 REPS EACH: LAQ, HIP FLEX, AP'S - SEATED IN RECLINER.  -CD     Row Name 09/29/22 1407          Balance    Static Sitting Balance independent  -CD     Dynamic Sitting Balance standby assist  -CD     Position, Sitting Balance unsupported;sitting in chair  COMMODE  -CD     Sit to Stand Dynamic Balance contact guard;verbal cues  -CD     Static Standing Balance contact guard  -CD     Dynamic Standing Balance contact guard  -CD     Position/Device Used, Standing Balance walker, rolling  -CD     Balance Interventions standing;sit to stand;supported;static;dynamic  -CD     Comment, Balance AMBULATED WITH R WALKER  AND CGA WITHOUT OVERT LOB. LESS TREMULOUS WITH MOBILITY.  -CD           User Key  (r) = Recorded By, (t) = Taken By, (c) = Cosigned By    Initials Name Provider Type    CD Bibi Sun PT Physical Therapist               Goals/Plan    No documentation.                Clinical Impression     Row Name 09/29/22 1410          Pain    Pretreatment Pain Rating 0/10 - no pain  -CD     Posttreatment Pain Rating 0/10 - no pain  -CD     Row Name 09/29/22 1410          Plan of Care Review    Plan of Care Reviewed With patient;daughter  -CD     Outcome Evaluation PT LESS TREMULOUS WITH MOBILITY AND ABLE TO INCREASE AMBULATION DISTANCE  FEET WITH R WALKER AND CGA. PT CONTIUNUES TO C/O WEAKNESS AND IS FEARFUL WITH MOBILITY. NEEDS CUES FOR SEQUENCING AND SAFETY WITH TRANSFERS. BP STABLE.  -CD     Row Name 09/29/22 1410          Therapy Assessment/Plan (PT)    Patient/Family Therapy Goals Statement (PT) TO RETURN TO PLOF.  -CD     Criteria for Skilled Interventions Met (PT) yes;meets criteria;skilled treatment is necessary  -CD     Row Name 09/29/22 1410          Vital Signs    Pre Systolic BP Rehab 159  -CD     Pre Treatment Diastolic BP 85  -CD     Intra Systolic BP Rehab 167  -CD     Intra Treatment Diastolic BP 87  -CD     Post Systolic BP Rehab 177  -CD     Post Treatment Diastolic BP 85  -CD     Pre SpO2 (%) 94  -CD     O2 Delivery Pre Treatment room air  -CD     O2 Delivery Intra Treatment room air  -CD     Post SpO2 (%) 92  -CD     O2 Delivery Post Treatment room air  -CD     Pre Patient Position Sitting  -CD     Intra Patient Position Standing  -CD     Post Patient Position Sitting  AFTER GAIT IN NIEVES.  -CD     Row Name 09/29/22 1410          Positioning and Restraints    Pre-Treatment Position sitting in chair/recliner  -CD     Post Treatment Position chair  -CD     In Chair reclined;call light within reach;encouraged to call for assist;with family/caregiver;legs elevated;notified nsg;exit alarm on  -CD            User Key  (r) = Recorded By, (t) = Taken By, (c) = Cosigned By    Initials Name Provider Type    CD Bibi Sun PT Physical Therapist               Outcome Measures     Row Name 09/29/22 1412 09/29/22 0800       How much help from another person do you currently need...    Turning from your back to your side while in flat bed without using bedrails? 3  -CD 3  -CM    Moving from lying on back to sitting on the side of a flat bed without bedrails? 3  -CD 3  -CM    Moving to and from a bed to a chair (including a wheelchair)? 3  -CD 3  -CM    Standing up from a chair using your arms (e.g., wheelchair, bedside chair)? 3  -CD 3  -CM    Climbing 3-5 steps with a railing? -- 2  -CM    To walk in hospital room? 3  -CD 3  -CM    AM-PAC 6 Clicks Score (PT) -- 17  -CM    Highest level of mobility -- 5 --> Static standing  -CM    Row Name 09/29/22 1412          Functional Assessment    Outcome Measure Options AM-PAC 6 Clicks Basic Mobility (PT)  -CD           User Key  (r) = Recorded By, (t) = Taken By, (c) = Cosigned By    Initials Name Provider Type    CD Bibi Sun PT Physical Therapist    Cuca Uriarte, RN Registered Nurse                             Physical Therapy Education                 Title: PT OT SLP Therapies (In Progress)     Topic: Physical Therapy (Done)     Point: Mobility training (Done)     Learning Progress Summary           Patient Acceptance, E, VU,NR by CD at 9/29/2022 1413    Comment: SEE FLOWSHEET    Acceptance, E, VU,NR by CD at 9/28/2022 1100    Comment: BENEFITS OF OOB ACTIVITY, SAFETY WITH MOBILITY, PROGRESSION OF POC, D/C PLANNING,                   Point: Home exercise program (Done)     Learning Progress Summary           Patient Acceptance, E, VU,NR by CD at 9/29/2022 1413    Comment: SEE FLOWSHEET    Acceptance, E, VU,NR by CD at 9/28/2022 1100    Comment: BENEFITS OF OOB ACTIVITY, SAFETY WITH MOBILITY, PROGRESSION OF POC, D/C PLANNING,                   Point: Body mechanics  (Done)     Learning Progress Summary           Patient Acceptance, E, VU,NR by CD at 9/29/2022 1413    Comment: SEE FLOWSHEET    Acceptance, E, VU,NR by CD at 9/28/2022 1100    Comment: BENEFITS OF OOB ACTIVITY, SAFETY WITH MOBILITY, PROGRESSION OF POC, D/C PLANNING,                   Point: Precautions (Done)     Learning Progress Summary           Patient Acceptance, E, VU,NR by CD at 9/29/2022 1413    Comment: SEE FLOWSHEET    Acceptance, E, VU,NR by CD at 9/28/2022 1100    Comment: BENEFITS OF OOB ACTIVITY, SAFETY WITH MOBILITY, PROGRESSION OF POC, D/C PLANNING,                               User Key     Initials Effective Dates Name Provider Type Discipline    CD 06/16/21 -  Bibi Sun PT Physical Therapist PT              PT Recommendation and Plan  Planned Therapy Interventions (PT): balance training, bed mobility training, transfer training, strengthening, patient/family education, home exercise program, gait training  Plan of Care Reviewed With: patient, daughter  Outcome Evaluation: PT LESS TREMULOUS WITH MOBILITY AND ABLE TO INCREASE AMBULATION DISTANCE  FEET WITH R WALKER AND CGA. PT CONTIUNUES TO C/O WEAKNESS AND IS FEARFUL WITH MOBILITY. NEEDS CUES FOR SEQUENCING AND SAFETY WITH TRANSFERS. BP STABLE.     Time Calculation:    PT Charges     Row Name 09/29/22 1413             Time Calculation    Start Time 1322  -CD      PT Received On 09/29/22  -CD      PT Goal Re-Cert Due Date 10/08/22  -CD              Time Calculation- PT    Total Timed Code Minutes- PT 31 minute(s)  -CD              Timed Charges    99968 - PT Therapeutic Exercise Minutes 10  -CD      72122 - Gait Training Minutes  13  -CD      08775 - PT Therapeutic Activity Minutes 8  -CD              Total Minutes    Timed Charges Total Minutes 31  -CD       Total Minutes 31  -CD            User Key  (r) = Recorded By, (t) = Taken By, (c) = Cosigned By    Initials Name Provider Type    CD Bibi Sun PT Physical Therapist               Therapy Charges for Today     Code Description Service Date Service Provider Modifiers Qty    21757607929 HC PT THERAPEUTIC ACT EA 15 MIN 2022 Bibi Sun, PT GP 1    38421834963 HC PT EVAL MOD COMPLEXITY 4 2022 Bibi Sun, PT GP 1    30155752258 HC PT THER PROC EA 15 MIN 2022 Bibi Sun, PT GP 1    80731836799 HC GAIT TRAINING EA 15 MIN 2022 Bibi Sun, PT GP 1          PT G-Codes  Outcome Measure Options: AM-PAC 6 Clicks Basic Mobility (PT)  AM-PAC 6 Clicks Score (PT): 17  AM-PAC 6 Clicks Score (OT): 17    Bibi Sun PT  2022      Electronically signed by Bibi Sun, PT at 22 1415          Occupational Therapy Notes (most recent note)      Maria Del Rosario Romo, OT at 22 1504          Patient Name: Ila Galaviz  : 1935    MRN: [id number]                              Today's Date: 2022       Admit Date: 2022    Visit Dx:     ICD-10-CM ICD-9-CM   1. Cognitive changes  R41.89 799.59   2. Vertigo  R42 780.4   3. Generalized weakness  R53.1 780.79   4. Inability to walk  R26.2 719.7     Patient Active Problem List   Diagnosis   • Recurrent UTI   • Leukocytosis   • Weakness   • CKD (chronic kidney disease) stage 3, GFR 30-59 ml/min ([facility])   • NICM (nonischemic cardiomyopathy) ([facility])   • HTN (hypertension)   • Hypothyroidism (acquired)   • Lower urinary tract symptoms (LUTS)   • Vertigo   • Hypothyroidism (acquired)     Past Medical History:   Diagnosis Date   • CHF (congestive heart failure) ([facility])    • Disease of thyroid gland    • Hypertension    • Kidney disease    • Kidney stone      Past Surgical History:   Procedure Laterality Date   • KIDNEY STONE SURGERY     • THYROID SURGERY        General Information     Row Name 22 1534          OT Time and Intention    Document Type therapy note (daily note)  -MR     Mode of Treatment occupational therapy  -MR     Row Name 22 8539          General Information    Patient Profile Reviewed  yes  -MR     Existing Precautions/Restrictions fall  monitor BP, BLE weakness (R>L), increased anxiety w/ mobility  -MR     Barriers to Rehab medically complex  -MR     Row Name 09/29/22 1534          Cognition    Orientation Status (Cognition) oriented x 3  -MR     Row Name 09/29/22 1534          Safety Issues, Functional Mobility    Safety Issues Affecting Function (Mobility) insight into deficits/self-awareness;safety precaution awareness;safety precautions follow-through/compliance;sequencing abilities  -MR     Impairments Affecting Function (Mobility) balance;endurance/activity tolerance;strength  -MR     Comment, Safety Issues/Impairments (Mobility) Pt is anxious w/ mobility  -MR           User Key  (r) = Recorded By, (t) = Taken By, (c) = Cosigned By    Initials Name Provider Type    aMria Del Rosario Arauz, OT Occupational Therapist                 Mobility/ADL's     Row Name 09/29/22 1536          Bed Mobility    Comment, (Bed Mobility) Pt received UIC and left UIC.  -MR     Row Name 09/29/22 1536          Transfers    Comment, (Transfers) Deferred transfers d/t hypertension  -MR           User Key  (r) = Recorded By, (t) = Taken By, (c) = Cosigned By    Initials Name Provider Type     Maria Del Rosario Romo, OT Occupational Therapist               Obj/Interventions     Row Name 09/29/22 1537          Shoulder (Therapeutic Exercise)    Shoulder (Therapeutic Exercise) AROM (active range of motion)  -MR     Shoulder AROM (Therapeutic Exercise) bilateral;flexion;extension;aBduction;aDduction;external rotation;internal rotation;sitting;10 repetitions  -MR     Row Name 09/29/22 1537          Elbow/Forearm (Therapeutic Exercise)    Elbow/Forearm (Therapeutic Exercise) AROM (active range of motion)  -MR     Elbow/Forearm AROM (Therapeutic Exercise) bilateral;flexion;extension;sitting;10 repetitions  -MR     Row Name 09/29/22 1537          Motor Skills    Therapeutic Exercise shoulder;elbow/forearm;wrist;other (see comments)   Pt demo good effort w/ pulmonary BUE ther ex; BLE ther ex x 10 reps of LAQ, hip flexion and ankle DF/PF.  -MR     Row Name 09/29/22 1537          Balance    Balance Assessment sitting static balance;sitting dynamic balance  -MR     Static Sitting Balance independent  -MR     Dynamic Sitting Balance standby assist  -MR     Position, Sitting Balance unsupported  -MR           User Key  (r) = Recorded By, (t) = Taken By, (c) = Cosigned By    Initials Name Provider Type    MR Romo Maria Del Rosario, OT Occupational Therapist               Goals/Plan    No documentation.                Clinical Impression     Row Name 09/29/22 1540          Pain Assessment    Pretreatment Pain Rating 0/10 - no pain  -MR     Posttreatment Pain Rating 0/10 - no pain  -MR     Pre/Posttreatment Pain Comment Denied pain pre and post session  -MR     Pain Intervention(s) Ambulation/increased activity;Repositioned  -     Row Name 09/29/22 1540          Plan of Care Review    Plan of Care Reviewed With patient;daughter  -MR     Progress improving  -MR     Outcome Evaluation Pt received [facility] and was highly motivated to engage in ther ex. Pt demo good effort w/ BUE and BLE ther ex. BP increased from 165/78 to 177/65. Continue per current POC as able. Recommend inpatient rehab at d/c for best functional outcome.  -     Row Name 09/29/22 1540          Therapy Plan Review/Discharge Plan (OT)    Anticipated Discharge Disposition (OT) inpatient rehabilitation facility  -     Row Name 09/29/22 1540          Vital Signs    Pre Systolic BP Rehab 165  -MR     Pre Treatment Diastolic BP 78  -MR     Post Systolic BP Rehab 177  -MR     Post Treatment Diastolic BP 65  -MR     Pre SpO2 (%) 94  -MR     O2 Delivery Pre Treatment nasal cannula  -MR     O2 Delivery Intra Treatment nasal cannula  -MR     Post SpO2 (%) 94  -MR     O2 Delivery Post Treatment nasal cannula  -MR     Pre Patient Position Sitting  -MR     Intra Patient Position Sitting  -MR     Post Patient  Position Sitting  -MR     Row Name 09/29/22 1540          Positioning and Restraints    Pre-Treatment Position sitting in chair/recliner  -MR     Post Treatment Position chair  -MR     In Chair notified nsg;reclined;sitting;call light within reach;encouraged to call for assist;exit alarm on;legs elevated;waffle cushion;with family/caregiver  -MR           User Key  (r) = Recorded By, (t) = Taken By, (c) = Cosigned By    Initials Name Provider Type    Maria Del Rosario Arauz, DIDI Occupational Therapist               Outcome Measures     Row Name 09/29/22 1546          How much help from another is currently needed...    Putting on and taking off regular lower body clothing? 3  -MR     Bathing (including washing, rinsing, and drying) 2  -MR     Toileting (which includes using toilet bed pan or urinal) 3  -MR     Putting on and taking off regular upper body clothing 3  -MR     Taking care of personal grooming (such as brushing teeth) 3  -MR     Eating meals 3  -MR     AM-PAC 6 Clicks Score (OT) 17  -MR     Row Name 09/29/22 1412 09/29/22 0800       How much help from another person do you currently need...    Turning from your back to your side while in flat bed without using bedrails? 3  -CD 3  -CM    Moving from lying on back to sitting on the side of a flat bed without bedrails? 3  -CD 3  -CM    Moving to and from a bed to a chair (including a wheelchair)? 3  -CD 3  -CM    Standing up from a chair using your arms (e.g., wheelchair, bedside chair)? 3  -CD 3  -CM    Climbing 3-5 steps with a railing? -- 2  -CM    To walk in hospital room? 3  -CD 3  -CM    AM-PAC 6 Clicks Score (PT) -- 17  -CM    Highest level of mobility -- 5 --> Static standing  -CM    Row Name 09/29/22 1546 09/29/22 1412       Functional Assessment    Outcome Measure Options AM-PAC 6 Clicks Daily Activity (OT)  -MR AM-PAC 6 Clicks Basic Mobility (PT)  -CD          User Key  (r) = Recorded By, (t) = Taken By, (c) = Cosigned By    Initials Name Provider Type     Bibi Thayer, PT Physical Therapist    Maria Del Rosario Arauz, OT Occupational Therapist    CM Ccua Obando, RN Registered Nurse                Occupational Therapy Education                 Title: PT OT SLP Therapies (Done)     Topic: Occupational Therapy (Done)     Point: ADL training (Done)     Description:   Instruct learner(s) on proper safety adaptation and remediation techniques during self care or transfers.   Instruct in proper use of assistive devices.              Learning Progress Summary           Patient Acceptance, E, VU by MR at 9/29/2022 1546    Acceptance, E,D, DU,NR by  at 9/28/2022 1158   Family Acceptance, E,D, DU,NR by  at 9/28/2022 1158                   Point: Home exercise program (Done)     Description:   Instruct learner(s) on appropriate technique for monitoring, assisting and/or progressing therapeutic exercises/activities.              Learning Progress Summary           Patient Acceptance, E, VU by MR at 9/29/2022 1546                   Point: Precautions (Done)     Description:   Instruct learner(s) on prescribed precautions during self-care and functional transfers.              Learning Progress Summary           Patient Acceptance, E, VU by MR at 9/29/2022 1546    Acceptance, E, VU by MB at 9/29/2022 0458    Acceptance, E,D, DU,NR by  at 9/28/2022 1158   Family Acceptance, E,D, DU,NR by  at 9/28/2022 1158                   Point: Body mechanics (Done)     Description:   Instruct learner(s) on proper positioning and spine alignment during self-care, functional mobility activities and/or exercises.              Learning Progress Summary           Patient Acceptance, E, VU by MR at 9/29/2022 1546    Acceptance, E,D, DU,NR by  at 9/28/2022 1158   Family Acceptance, E,D, DU,NR by  at 9/28/2022 1158                               User Key     Initials Effective Dates Name Provider Type Discipline     06/16/21 -  Yovani Wilson, OT Occupational Therapist OT      09/22/22 -  Maria Del Rosario Romo OT Occupational Therapist OT    MB 08/15/22 -  Jina Porter, RN Registered Nurse Nurse              OT Recommendation and Plan     Plan of Care Review  Plan of Care Reviewed With: patient, daughter  Progress: improving  Outcome Evaluation: Pt received UIC and was highly motivated to engage in ther ex. Pt demo good effort w/ BUE and BLE ther ex. BP increased from 165/78 to 177/65. Continue per current POC as able. Recommend inpatient rehab at d/c for best functional outcome.     Time Calculation:    Time Calculation- OT     Row Name 09/29/22 1547 09/29/22 1413          Time Calculation- OT    OT Start Time 1504  -MR --     OT Received On 09/29/22  -MR --     OT Goal Re-Cert Due Date 10/08/22  -MR --            Timed Charges    42617 - OT Therapeutic Exercise Minutes 23  -MR --     22429 - Gait Training Minutes  -- 13  -CD            Total Minutes    Timed Charges Total Minutes 23  -MR 13  -CD      Total Minutes 23  -MR 13  -CD           User Key  (r) = Recorded By, (t) = Taken By, (c) = Cosigned By    Initials Name Provider Type    CD Bibi Sun PT Physical Therapist    MR Maria Del Rosario Romo OT Occupational Therapist              Therapy Charges for Today     Code Description Service Date Service Provider Modifiers Qty    [id number] HC OT THER PROC EA 15 MIN 9/29/2022 Maria Del Rosario Romo OT GO 2               Maria Del Rosario Romo OT  9/29/2022    Electronically signed by Maria Del Rosario Romo OT at 09/29/22 9340

## 2022-10-04 NOTE — OUTREACH NOTE
Prep Survey    Flowsheet Row Responses   [facility] patient discharged from? [place]   Is LACE score < 7 ? No   Emergency Room discharge w/ pulse ox? No   Eligibility Readm Mgmt   Discharge diagnosis Dizziness, lower ext weakness, anxiety   Does the patient have one of the following disease processes/diagnoses(primary or secondary)? Other   Does the patient have Home health ordered? Yes   What is the Home health agency?  [facility]   Is there a DME ordered? No   Prep survey completed? Yes          VANE ZAMUDIO - Registered Nurse

## 2022-10-06 ENCOUNTER — READMISSION MANAGEMENT (OUTPATIENT)
Dept: CALL CENTER | Facility: HOSPITAL | Age: 87
End: 2022-10-06

## 2022-10-06 NOTE — OUTREACH NOTE
Medical Week 1 Survey    Flowsheet Row Responses   [facility] patient discharged from? [place]   Does the patient have one of the following disease processes/diagnoses(primary or secondary)? Other   Week 1 attempt successful? Yes   Call start time 1404   Call end time 1417   Person spoke with today (if not patient) and relationship Joycelyn-daughter.   Meds reviewed with patient/caregiver? Yes   Is the patient having any side effects they believe may be caused by any medication additions or changes? Yes   Side effects comments  Daughter states is holding Atarax PRN due to lethargy when takes Atarax. States is alert, oriented since holding Atarax.   Does the patient have all medications ordered at discharge? Yes   Is the patient taking all medications as directed (includes completed medication regime)? Yes   Comments regarding appointments PCP appt next week.   Does the patient have a primary care provider?  Yes   Does the patient have an appointment with their PCP within 7 days of discharge? Yes   Has the patient kept scheduled appointments due by today? N/A   Has home health visited the patient within 72 hours of discharge? Yes   Home health comments  nurse and OT visited yesterday from [facility]. [facility] PT visited today. Daughter wishes to change to [facility] for services. [facility] will discuss with PCP-waiting for return call from PCP office.   Psychosocial issues? No   Did the patient receive a copy of their discharge instructions? Yes   Nursing interventions Reviewed instructions with patient   What is the patient's perception of their health status since discharge? Improving   Is the patient/caregiver able to teach back signs and symptoms related to disease process for when to call PCP? Yes   Is the patient/caregiver able to teach back signs and symptoms related to disease process for when to call 911? Yes   Is the patient/caregiver able to teach back the hierarchy of who to call/visit for  symptoms/problems? PCP, Specialist, Home health nurse, Urgent Care, ED, 911 Yes   If the patient is a current smoker, are they able to teach back resources for cessation? Not a smoker   Week 1 call completed? Yes   Wrap up additional comments Daughter states is overall improving-still has some weakness, and working with HH PT. States BP systolic was in 160s today-advised to notify HH nurse. States HH PT visited today. Denies any edema or SOA. States alert since holding Atarax. Encouraged to discuss meds with PCP.           ASHLEIGH CHOPRA - Registered Nurse

## 2023-09-09 PROCEDURE — 87086 URINE CULTURE/COLONY COUNT: CPT | Performed by: PHYSICIAN ASSISTANT
